# Patient Record
Sex: MALE | Race: WHITE
[De-identification: names, ages, dates, MRNs, and addresses within clinical notes are randomized per-mention and may not be internally consistent; named-entity substitution may affect disease eponyms.]

---

## 2017-12-13 ENCOUNTER — HOSPITAL ENCOUNTER (EMERGENCY)
Dept: HOSPITAL 62 - ER | Age: 67
Discharge: HOME | End: 2017-12-13
Payer: MEDICARE

## 2017-12-13 VITALS — DIASTOLIC BLOOD PRESSURE: 77 MMHG | SYSTOLIC BLOOD PRESSURE: 129 MMHG

## 2017-12-13 DIAGNOSIS — I10: ICD-10-CM

## 2017-12-13 DIAGNOSIS — Z79.82: ICD-10-CM

## 2017-12-13 DIAGNOSIS — D75.1: ICD-10-CM

## 2017-12-13 DIAGNOSIS — I25.10: ICD-10-CM

## 2017-12-13 DIAGNOSIS — E11.9: ICD-10-CM

## 2017-12-13 DIAGNOSIS — R53.1: Primary | ICD-10-CM

## 2017-12-13 LAB
ALBUMIN SERPL-MCNC: 4.2 G/DL (ref 3.5–5)
ALP SERPL-CCNC: 85 U/L (ref 38–126)
ALT SERPL-CCNC: 50 U/L (ref 21–72)
ANION GAP SERPL CALC-SCNC: 16 MMOL/L (ref 5–19)
APPEARANCE UR: CLEAR
AST SERPL-CCNC: 33 U/L (ref 17–59)
BASOPHILS # BLD AUTO: 0.1 10^3/UL (ref 0–0.2)
BASOPHILS NFR BLD AUTO: 0.9 % (ref 0–2)
BILIRUB DIRECT SERPL-MCNC: 0.3 MG/DL (ref 0–0.4)
BILIRUB SERPL-MCNC: 0.6 MG/DL (ref 0.2–1.3)
BILIRUB UR QL STRIP: NEGATIVE
BUN SERPL-MCNC: 16 MG/DL (ref 7–20)
CALCIUM: 9.8 MG/DL (ref 8.4–10.2)
CHLORIDE SERPL-SCNC: 99 MMOL/L (ref 98–107)
CO2 SERPL-SCNC: 22 MMOL/L (ref 22–30)
CREAT SERPL-MCNC: 1.02 MG/DL (ref 0.52–1.25)
EOSINOPHIL # BLD AUTO: 0.2 10^3/UL (ref 0–0.6)
EOSINOPHIL NFR BLD AUTO: 2.4 % (ref 0–6)
ERYTHROCYTE [DISTWIDTH] IN BLOOD BY AUTOMATED COUNT: 13.3 % (ref 11.5–14)
GLUCOSE SERPL-MCNC: 138 MG/DL (ref 75–110)
GLUCOSE UR STRIP-MCNC: >=500 MG/DL
HCT VFR BLD CALC: 52.8 % (ref 37.9–51)
HGB BLD-MCNC: 18.1 G/DL (ref 13.5–17)
HGB HCT DIFFERENCE: 1.5
KETONES UR STRIP-MCNC: (no result) MG/DL
LYMPHOCYTES # BLD AUTO: 1.3 10^3/UL (ref 0.5–4.7)
LYMPHOCYTES NFR BLD AUTO: 15.9 % (ref 13–45)
MCH RBC QN AUTO: 30.9 PG (ref 27–33.4)
MCHC RBC AUTO-ENTMCNC: 34.4 G/DL (ref 32–36)
MCV RBC AUTO: 90 FL (ref 80–97)
MONOCYTES # BLD AUTO: 0.8 10^3/UL (ref 0.1–1.4)
MONOCYTES NFR BLD AUTO: 9.5 % (ref 3–13)
NEUTROPHILS # BLD AUTO: 5.9 10^3/UL (ref 1.7–8.2)
NEUTS SEG NFR BLD AUTO: 71.3 % (ref 42–78)
NITRITE UR QL STRIP: NEGATIVE
PH UR STRIP: 5 [PH] (ref 5–9)
POTASSIUM SERPL-SCNC: 4.7 MMOL/L (ref 3.6–5)
PROT SERPL-MCNC: 7.2 G/DL (ref 6.3–8.2)
PROT UR STRIP-MCNC: 100 MG/DL
RBC # BLD AUTO: 5.87 10^6/UL (ref 4.35–5.55)
SODIUM SERPL-SCNC: 136.8 MMOL/L (ref 137–145)
SP GR UR STRIP: 1.03
UROBILINOGEN UR-MCNC: NEGATIVE MG/DL (ref ?–2)
WBC # BLD AUTO: 8.3 10^3/UL (ref 4–10.5)

## 2017-12-13 PROCEDURE — 80053 COMPREHEN METABOLIC PANEL: CPT

## 2017-12-13 PROCEDURE — 84484 ASSAY OF TROPONIN QUANT: CPT

## 2017-12-13 PROCEDURE — 36415 COLL VENOUS BLD VENIPUNCTURE: CPT

## 2017-12-13 PROCEDURE — 81001 URINALYSIS AUTO W/SCOPE: CPT

## 2017-12-13 PROCEDURE — 93010 ELECTROCARDIOGRAM REPORT: CPT

## 2017-12-13 PROCEDURE — 99285 EMERGENCY DEPT VISIT HI MDM: CPT

## 2017-12-13 PROCEDURE — 93005 ELECTROCARDIOGRAM TRACING: CPT

## 2017-12-13 PROCEDURE — 85025 COMPLETE CBC W/AUTO DIFF WBC: CPT

## 2017-12-13 PROCEDURE — 70450 CT HEAD/BRAIN W/O DYE: CPT

## 2017-12-13 NOTE — EKG REPORT
SEVERITY:- OTHERWISE NORMAL ECG -

SINUS TACHYCARDIA

BORDERLINE LEFT AXIS DEVIATION

:

Confirmed by: Harry Martines 13-Dec-2017 22:22:36

## 2017-12-13 NOTE — ER DOCUMENT REPORT
ED Neuro Symptoms/Deficit





- General


Chief Complaint: General Weakness


Stated Complaint: WEAKNESS IN RIGHT HAND


Time Seen by Provider: 12/13/17 17:29


Notes: 





Patient had use of his right hand, according to his wife.  He had an episode a 

month or so ago, around early or mid October, with both his right hand and leg 

being difficult to control.  He was seen here and had a complete workup 

including CT scan of the brain and was felt to be improving and discharged 

home.  Patient right hand recovered completely as did his right leg.  Then, 

yesterday, the weakness of the right hand returned.  Patient was also feeling 

lightheaded and dizzy.  He did not lose his balance or fall, however.  He does 

not have involvement of his leg at this time.  Wife says that he seems to be 

improving somewhat today.





Patient has a history of hydrocephalus for which he had shunt placed in his 

brain in 1979.





PMH: Coronary stent, hypertension, NIDDM.  Patient's primary care provider is 

Dr. Burch.


TRAVEL OUTSIDE OF THE [place] IN LAST 30 DAYS: No





- Related Data


Allergies/Adverse Reactions: 


 





No Known Allergies Allergy (Verified 12/13/17 16:21)


 











Past Medical History





- Social History


Smoking Status: Never Smoker


Chew tobacco use (# tins/day): No


Frequency of alcohol use: None


Drug Abuse: None


Family History: Reviewed & Not Pertinent


Patient has suicidal ideation: No


Patient has homicidal ideation: No





- Past Medical History


Cardiac Medical History: Reports: Hx Coronary Artery Disease, Hx Hypertension


Endocrine Medical History: Reports: Hx Diabetes Mellitus Type 1, Hx Diabetes 

Mellitus Type 2


Past Surgical History: Reports: Hx Cardiac Catheterization, Hx Neurologic 

Surgery - brain for hydrocephalus.





Review of Systems





- Review of Systems


Notes: 





REVIEW OF SYSTEMS:





CONSTITUTIONAL :  Denies fever.


  


EENT:   Denies eye, ear, nose or mouth or throat pain or other symptoms.





CARDIOVASCULAR:  Denies chest pain.





RESPIRATORY:  Denies cough, chest congestion, or shortness of breath.





GASTROINTESTINAL:  Denies abdominal pain or nausea, vomiting, or diarrhea.





GENITOURINARY:  Denies difficulty or painful urinating, urinary frequency, 

blood in urine.





MUSCULOSKELETAL:  Denies back or neck pain.  Denies joint pain or swelling.





SKIN:   Denies rash or skin lesions.





NEUROLOGICAL:  Denies LOC or altered mental status.  Denies headache.  See HPI.

  





ALL OTHER SYSTEMS REVIEWED AND NEGATIVE.





Physical Exam





- Vital signs


Vitals: 


 











Temp Pulse Resp BP Pulse Ox


 


 98.3 F   126 H  18   139/88 H  95 


 


 12/13/17 16:24  12/13/17 16:24  12/13/17 16:24  12/13/17 16:24  12/13/17 16:24











Interpretation: Tachycardic - Tachycardia in triage not found by me on 

examining patient, heart rate at bedside 100.





- Notes


Notes: 


PHYSICAL EXAMINATION:





GENERAL: Well-appearing, in no acute distress.  Patient can stand without 

assistance and walk around the examining room without difficulty and without 

losing his balance.





HEAD: Atraumatic, normocephalic.





EYES: Pupils equal round and reactive to light, extraocular movements intact.





ENT: oropharynx clear without exudates.  Moist mucous membranes.





NECK: Normal range of motion, supple.





LUNGS: Breath sounds clear and equal bilaterally.





HEART: Regular rate and rhythm without murmurs.





ABDOMEN: Soft, nontender.  No guarding or rebound.





BACK:  No tenderness throughout entire back.





EXTREMITIES: Normal range of motion without pain.





NEUROLOGICAL:  Normal speech, normal gait.  Normal sensory, motor, and reflex 

exams.  Patient has equal  with both hands.  No current deficits.  Awake, 

alert, and oriented x3.  Cranial nerves normal.





PSYCH: Normal mood, normal affect.





SKIN: Warm, dry, no rashes.





Course





- Re-evaluation


Re-evalutation: 





12/13/17 19:53


12/13/17 19:51


Patient remained stable throughout his stay in the department.





He takes a baby aspirin every morning around 10 AM and I advised him to take 1 

tonight at bedtime and to take baby aspirin twice a day until he sees his 

primary care provider and they discuss his blood thinning, given the elevated 

hemoglobin that he has.





- Vital Signs


Vital signs: 


 











Temp Pulse Resp BP Pulse Ox


 


 98.2 F   126 H  13   129/77 H  94 


 


 12/13/17 18:01  12/13/17 16:24  12/13/17 19:14  12/13/17 19:14  12/13/17 19:14














12/13/17 19:53








- Laboratory


Result Diagrams: 


 12/13/17 17:51





 12/13/17 17:51


Laboratory results interpreted by me: 


 











  12/13/17 12/13/17 12/13/17





  17:51 17:51 18:50


 


RBC  5.87 H  


 


Hgb  18.1 H  


 


Hct  52.8 H  


 


Sodium   136.8 L 


 


Glucose   138 H 


 


Urine Protein    100 H


 


Urine Glucose (UA)    >=500 H


 


Urine Ketones    TRACE H


 


Urine Ascorbic Acid    40 H











12/13/17 19:52


Incidentally noted GAB globin of 18.1.  Patient and wife not aware of this in 

the patient's history.  I have copied all the labs for them to take with them 

and contact her primary care provider tomorrow.





- Diagnostic Test


Radiology reviewed: Image reviewed, Reports reviewed - CT scan of the brain 

shows a stable mass in the ventricle that is about the same size on previous 

exam.  No acute findings on the CT scan.  No evidence of stroke.





- EKG Interpretation by Me


EKG shows normal: Sinus rhythm


Rate: Normal


Rhythm: NSR - At 102.





Discharge





- Discharge


Clinical Impression: 


 Weakness of right hand, Polycythemia





Condition: Stable


Disposition: HOME, SELF-CARE


Additional Instructions: 


Weakness Right Hand:


     We did not find a definite cause for your weakness. This may require 

further medical tests. Weakness can be caused by infection, physical exhaustion

, rapid weight loss, dehydration, or medicine side effects. Diseases of the 

muscles, heart, nerves, and blood vessels can make you weak. Sometimes the 

problem is simply depression or lack of exercise.


     You should get plenty of rest. Unless the doctor tells you otherwise, it's 

usually best to add short periods of regular mild exercise. Eat a nutritious 

diet with multiple small, low-sugar meals.


     If symptoms continue, additional medical evaluation will be necessary. Be 

sure to follow up as instructed.


     If you become very dizzy, nauseated, or feel like you're going to faint, 

lie down right away. Wait until the symptoms have passed before you get up 

again. Stand up slowly.


     Call the doctor or return if you develop chest pain, abdominal pain, 

severe headache, irregular heartbeat or very fast pulse, confusion, vision 

problems, fever, muscular pain, or any other new symptom.





Your CT scan findings are of a chronic, previously seen mass that has not 

enlarged.  There is no evidence of any increased intracranial pressure.





Your hemoglobin was 18.1.


Polycythemia


     We have found a higher than normal count of red blood cells. When the 

blood is thick with extra red cells, we call it "polycythemia." Blood cells are 

created in your bone marrow. In polycythemia, the marrow is over-active, making 

extra blood cells. Polycythemia can be a reaction to low oxygen in your blood, 

as occurs with chronic bronchitis or sleep apnea. Sometimes no clear cause is 

found.


     Polycythemia can be dangerous, because the thickened blood clots more 

easily. There's a higher risk of stroke, heart attack, and blood clots.


     The best treatment for polycythemia is to treat the underlying cause. For 

example, treating lung disease to increase the blood oxygen may lower the count 

of red blood cells. If it's not possible to eliminate the cause of polycythemia

, you may be treated by removing some of your blood from time to time. This 

lowers the count of red cells and makes the blood thinner.


     Call your doctor or return if you have chest pain or new shortness of 

breath, or symptoms of a stroke such as memory problems, severe headache, 

vomiting, severe dizziness, weakness or numbness, double vision, a seizure, or 

problems with balance or coordination.





Increase your baby aspirin to twice a day instead of the current once a day.





FOLLOW-UP CARE:


If you have been referred to a physician for follow-up care, call the physician

s office for an appointment as you were instructed or within the next two days.

  If you experience worsening or a significant change in your symptoms, notify 

the physician immediately or return to the Emergency Department at any time for 

re-evaluation.














Contact Dr. Burch's office tomorrow morning to inform them of your visit 

here in the emergency department and the weakness that you are having in your 

right hand.  Also tell them about the finding of your increased hemoglobin 

level of 18.1.  Finally, find out if they want to to be on any additional blood 

thinning medications.

## 2017-12-13 NOTE — RADIOLOGY REPORT (SQ)
EXAM DESCRIPTION:  CT HEAD WITHOUT



COMPLETED DATE/TIME:  12/13/2017 5:30 pm



REASON FOR STUDY:  right hand weak



COMPARISON:  11/8/2017.



TECHNIQUE:  Axial images acquired through the brain without intravenous contrast.  Images reviewed wi
th bone, brain and subdural windows.  Images stored on PACS.

All CT scanners at this facility use dose modulation, iterative reconstruction, and/or weight based d
osing when appropriate to reduce radiation dose to as low as reasonably achievable (ALARA).

[facility]: Dose Right  [facility]: CareDose    [facility]: Dose Right    [facility]: Teradose 4D    [facility]: Smart Technologies



RADIATION DOSE:   mGy.



LIMITATIONS:  None.



FINDINGS:  VENTRICLES: Stable mild ventriculomegaly.  Bilateral ventricular shunt tubes.  Stable 2.3 
cm mass in the midline, possibly the 3rd ventricle.

CEREBRUM: No masses.  No hemorrhage.  No midline shift.  Areas of low density in the white matter mos
t likely due to chronic micro-vascular ischemic change.  No evidence for acute infarction.

CEREBELLUM: No masses.  No hemorrhage.  No alteration of density.  No evidence for acute infarction.

EXTRAAXIAL SPACES: Mild age-related involutional change.  No fluid collections.  No masses.

ORBITS AND GLOBE: No intra- or extraconal masses.  Normal contour of globe without masses.

CALVARIUM: No fracture.

PARANASAL SINUSES: No fluid or mucosal thickening.

SOFT TISSUES: No mass or hematoma.

OTHER: No other significant finding.



IMPRESSION:

1. 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

2. STABLE MILD VENTRICULOMEGALY WITH BILATERAL VENTRICULAR SHUNT TUBES.

3. MILD CHRONIC CHANGES OF ATROPHY AND MICROVASCULAR ISCHEMIA.  NO ACUTE PROCESS.

EVIDENCE OF ACUTE STROKE: NO.



TECHNICAL DOCUMENTATION:  JOB ID:  [id number]

Quality ID # [id number]: Final reports with documentation of one or more dose reduction techniques (e.g., Au
tomated exposure control, adjustment of the mA and/or kV according to patient size, use of iterative 
reconstruction technique)

 2011 [employer]- All Rights Reserved

## 2018-06-17 ENCOUNTER — HOSPITAL ENCOUNTER (INPATIENT)
Dept: HOSPITAL 62 - ER | Age: 68
LOS: 5 days | Discharge: NURSING, REHAB OR LONG-TERM CARE | DRG: 65 | End: 2018-06-22
Attending: INTERNAL MEDICINE | Admitting: INTERNAL MEDICINE
Payer: MEDICARE

## 2018-06-17 DIAGNOSIS — Z79.4: ICD-10-CM

## 2018-06-17 DIAGNOSIS — I10: ICD-10-CM

## 2018-06-17 DIAGNOSIS — F41.1: ICD-10-CM

## 2018-06-17 DIAGNOSIS — Z80.0: ICD-10-CM

## 2018-06-17 DIAGNOSIS — Z95.5: ICD-10-CM

## 2018-06-17 DIAGNOSIS — F41.8: ICD-10-CM

## 2018-06-17 DIAGNOSIS — Z79.899: ICD-10-CM

## 2018-06-17 DIAGNOSIS — Z79.82: ICD-10-CM

## 2018-06-17 DIAGNOSIS — R47.01: ICD-10-CM

## 2018-06-17 DIAGNOSIS — R47.81: ICD-10-CM

## 2018-06-17 DIAGNOSIS — E11.51: ICD-10-CM

## 2018-06-17 DIAGNOSIS — G89.4: ICD-10-CM

## 2018-06-17 DIAGNOSIS — I63.212: Primary | ICD-10-CM

## 2018-06-17 DIAGNOSIS — M79.641: ICD-10-CM

## 2018-06-17 DIAGNOSIS — Z66: ICD-10-CM

## 2018-06-17 DIAGNOSIS — E78.00: ICD-10-CM

## 2018-06-17 DIAGNOSIS — Z82.49: ICD-10-CM

## 2018-06-17 DIAGNOSIS — E66.01: ICD-10-CM

## 2018-06-17 DIAGNOSIS — Z87.891: ICD-10-CM

## 2018-06-17 DIAGNOSIS — F32.9: ICD-10-CM

## 2018-06-17 DIAGNOSIS — G81.91: ICD-10-CM

## 2018-06-17 DIAGNOSIS — E11.65: ICD-10-CM

## 2018-06-17 DIAGNOSIS — I25.10: ICD-10-CM

## 2018-06-17 LAB
ADD MANUAL DIFF: NO
ALBUMIN SERPL-MCNC: 4.4 G/DL (ref 3.5–5)
ALP SERPL-CCNC: 80 U/L (ref 38–126)
ALT SERPL-CCNC: 56 U/L (ref 21–72)
ANION GAP SERPL CALC-SCNC: 16 MMOL/L (ref 5–19)
APPEARANCE UR: CLEAR
APPEARANCE UR: CLEAR
APTT BLD: 26.8 SEC (ref 23.5–35.8)
APTT PPP: YELLOW S
APTT PPP: YELLOW S
ARTERIAL BLOOD FIO2: (no result)
ARTERIAL BLOOD H2CO3: 0.9 MMOL/L (ref 1.05–1.35)
ARTERIAL BLOOD HCO3: 21.1 MMOL/L (ref 20–26)
ARTERIAL BLOOD PCO2: 30 MMHG (ref 35–45)
ARTERIAL BLOOD PH: 7.46 (ref 7.35–7.45)
ARTERIAL BLOOD PO2: 80.4 MMHG (ref 80–100)
ARTERIAL BLOOD TOTAL CO2: 22 MMOL/L (ref 23–27)
AST SERPL-CCNC: 38 U/L (ref 17–59)
BARBITURATES UR QL SCN: NEGATIVE
BASE EXCESS BLDA CALC-SCNC: -1.2 MMOL/L
BASOPHILS # BLD AUTO: 0 10^3/UL (ref 0–0.2)
BASOPHILS NFR BLD AUTO: 0.5 % (ref 0–2)
BILIRUB DIRECT SERPL-MCNC: 0.5 MG/DL (ref 0–0.4)
BILIRUB SERPL-MCNC: 0.7 MG/DL (ref 0.2–1.3)
BILIRUB UR QL STRIP: NEGATIVE
BILIRUB UR QL STRIP: NEGATIVE
BUN SERPL-MCNC: 14 MG/DL (ref 7–20)
CALCIUM: 9.7 MG/DL (ref 8.4–10.2)
CHLORIDE SERPL-SCNC: 97 MMOL/L (ref 98–107)
CK MB SERPL-MCNC: 2.8 NG/ML (ref ?–4.55)
CK MB SERPL-MCNC: 3.25 NG/ML (ref ?–4.55)
CK SERPL-CCNC: 95 U/L (ref 55–170)
CO2 SERPL-SCNC: 25 MMOL/L (ref 22–30)
EOSINOPHIL # BLD AUTO: 0.1 10^3/UL (ref 0–0.6)
EOSINOPHIL NFR BLD AUTO: 1 % (ref 0–6)
ERYTHROCYTE [DISTWIDTH] IN BLOOD BY AUTOMATED COUNT: 13.5 % (ref 11.5–14)
GLUCOSE SERPL-MCNC: 229 MG/DL (ref 75–110)
GLUCOSE UR STRIP-MCNC: >=500 MG/DL
GLUCOSE UR STRIP-MCNC: >=500 MG/DL
HCT VFR BLD CALC: 52 % (ref 37.9–51)
HGB BLD-MCNC: 17.9 G/DL (ref 13.5–17)
INR PPP: 0.94
KETONES UR STRIP-MCNC: (no result) MG/DL
KETONES UR STRIP-MCNC: 20 MG/DL
LYMPHOCYTES # BLD AUTO: 1.5 10^3/UL (ref 0.5–4.7)
LYMPHOCYTES NFR BLD AUTO: 14.8 % (ref 13–45)
MCH RBC QN AUTO: 30.7 PG (ref 27–33.4)
MCHC RBC AUTO-ENTMCNC: 34.5 G/DL (ref 32–36)
MCV RBC AUTO: 89 FL (ref 80–97)
METHADONE UR QL SCN: NEGATIVE
MONOCYTES # BLD AUTO: 0.6 10^3/UL (ref 0.1–1.4)
MONOCYTES NFR BLD AUTO: 6.1 % (ref 3–13)
NEUTROPHILS # BLD AUTO: 8 10^3/UL (ref 1.7–8.2)
NEUTS SEG NFR BLD AUTO: 77.6 % (ref 42–78)
NITRITE UR QL STRIP: NEGATIVE
NITRITE UR QL STRIP: NEGATIVE
PCP UR QL SCN: NEGATIVE
PH UR STRIP: 5 [PH] (ref 5–9)
PH UR STRIP: 6 [PH] (ref 5–9)
PLATELET # BLD: 229 10^3/UL (ref 150–450)
POTASSIUM SERPL-SCNC: 4.7 MMOL/L (ref 3.6–5)
PROT SERPL-MCNC: 7.5 G/DL (ref 6.3–8.2)
PROT UR STRIP-MCNC: >=500 MG/DL
PROT UR STRIP-MCNC: >=500 MG/DL
PROTHROMBIN TIME: 13.1 SEC (ref 11.4–15.4)
RBC # BLD AUTO: 5.83 10^6/UL (ref 4.35–5.55)
SAO2 % BLDA: 96.6 % (ref 94–98)
SODIUM SERPL-SCNC: 138.1 MMOL/L (ref 137–145)
SP GR UR STRIP: 1.02
SP GR UR STRIP: 1.04
TOTAL CELLS COUNTED % (AUTO): 100 %
TROPONIN I SERPL-MCNC: 0.35 NG/ML
TROPONIN I SERPL-MCNC: < 0.012 NG/ML
URINE AMPHETAMINES SCREEN: NEGATIVE
URINE BENZODIAZEPINES SCREEN: NEGATIVE
URINE COCAINE SCREEN: NEGATIVE
URINE MARIJUANA (THC) SCREEN: NEGATIVE
UROBILINOGEN UR-MCNC: NEGATIVE MG/DL (ref ?–2)
UROBILINOGEN UR-MCNC: NEGATIVE MG/DL (ref ?–2)
WBC # BLD AUTO: 10.3 10^3/UL (ref 4–10.5)

## 2018-06-17 PROCEDURE — 70450 CT HEAD/BRAIN W/O DYE: CPT

## 2018-06-17 PROCEDURE — S0164 INJECTION PANTROPRAZOLE: HCPCS

## 2018-06-17 PROCEDURE — 81001 URINALYSIS AUTO W/SCOPE: CPT

## 2018-06-17 PROCEDURE — 85730 THROMBOPLASTIN TIME PARTIAL: CPT

## 2018-06-17 PROCEDURE — 87040 BLOOD CULTURE FOR BACTERIA: CPT

## 2018-06-17 PROCEDURE — 83036 HEMOGLOBIN GLYCOSYLATED A1C: CPT

## 2018-06-17 PROCEDURE — 80048 BASIC METABOLIC PNL TOTAL CA: CPT

## 2018-06-17 PROCEDURE — 82962 GLUCOSE BLOOD TEST: CPT

## 2018-06-17 PROCEDURE — 99291 CRITICAL CARE FIRST HOUR: CPT

## 2018-06-17 PROCEDURE — 84484 ASSAY OF TROPONIN QUANT: CPT

## 2018-06-17 PROCEDURE — 36415 COLL VENOUS BLD VENIPUNCTURE: CPT

## 2018-06-17 PROCEDURE — 70496 CT ANGIOGRAPHY HEAD: CPT

## 2018-06-17 PROCEDURE — 82803 BLOOD GASES ANY COMBINATION: CPT

## 2018-06-17 PROCEDURE — 96374 THER/PROPH/DIAG INJ IV PUSH: CPT

## 2018-06-17 PROCEDURE — 80307 DRUG TEST PRSMV CHEM ANLYZR: CPT

## 2018-06-17 PROCEDURE — 85027 COMPLETE CBC AUTOMATED: CPT

## 2018-06-17 PROCEDURE — 71045 X-RAY EXAM CHEST 1 VIEW: CPT

## 2018-06-17 PROCEDURE — 70498 CT ANGIOGRAPHY NECK: CPT

## 2018-06-17 PROCEDURE — 84550 ASSAY OF BLOOD/URIC ACID: CPT

## 2018-06-17 PROCEDURE — 36600 WITHDRAWAL OF ARTERIAL BLOOD: CPT

## 2018-06-17 PROCEDURE — 93005 ELECTROCARDIOGRAM TRACING: CPT

## 2018-06-17 PROCEDURE — 85025 COMPLETE CBC W/AUTO DIFF WBC: CPT

## 2018-06-17 PROCEDURE — 85610 PROTHROMBIN TIME: CPT

## 2018-06-17 PROCEDURE — 93010 ELECTROCARDIOGRAM REPORT: CPT

## 2018-06-17 PROCEDURE — 82553 CREATINE MB FRACTION: CPT

## 2018-06-17 PROCEDURE — 80053 COMPREHEN METABOLIC PANEL: CPT

## 2018-06-17 PROCEDURE — 82550 ASSAY OF CK (CPK): CPT

## 2018-06-17 PROCEDURE — 80061 LIPID PANEL: CPT

## 2018-06-17 PROCEDURE — 93306 TTE W/DOPPLER COMPLETE: CPT

## 2018-06-17 RX ADMIN — Medication SCH ML: at 21:42

## 2018-06-17 RX ADMIN — INSULIN LISPRO PRN UNIT: 100 INJECTION, SOLUTION INTRAVENOUS; SUBCUTANEOUS at 23:00

## 2018-06-17 RX ADMIN — SODIUM CHLORIDE PRN ML: 9 INJECTION, SOLUTION INTRAVENOUS at 20:18

## 2018-06-17 RX ADMIN — ATORVASTATIN CALCIUM SCH MG: 20 TABLET, FILM COATED ORAL at 21:42

## 2018-06-17 NOTE — PDOC H&P
History of Present Illness


Admission Date/PCP: 


  18 15:20





  JOVI BERG MD





Patient complains of: Weakness and falls slurred speech


History of Present Illness: 


JOVI VITALE is a 67 year old man with difficult to control diabetes, 

obesity, hypertension who started falling last night, his wife wanted to take 

him to the ER but he did not want to go, this morning he was having slurred 

speech and she called 911.  The patient also had right arm weakness.  In the ER 

he had signs and symptoms consistent with stroke, onset time unknown and he was 

not a TPA candidate.  Scan of the head showed acute or subacute nonhemorrhagic 

stroke of the left posterior temporal cortex, along with a possible colloid 

cyst which has been seen in the past and also intraventricular drainage 

catheters unchanged from prior studies.  Patient has been able to protect his 

airway.  He has some expressive aphasia.  He is being admitted to the 

hospitalist service, [facility], for continued management.








Past Medical History


Cardiac Medical History: Reports: Coronary Artery Disease, Hyperlipidema, 

Hypertension, Peripheral Vascular Disease


Neurological Medical History: Reports: Other - Ventricular shunts for 

hydrocephalus.


   Denies: Hemorrhagic CVA, Ischemic CVA, Seizures


Endocrine Medical History: Reports: Diabetes Mellitus Type 1, Diabetes Mellitus 

Type 2


Renal/ Medical History: 


   Denies: End Stage Renal Disease


Malignancy Medical History: 


   Denies: None


GI Medical History: 


   Denies: Cirrhosis


Musculoskeltal Medical History: 


   Denies: Fibromyalgia, Gout


Skin Medical History: 


   Denies: Eczema, Psoriasis


Psychiatric Medical History: Reports: Depression, General Anxiety Disorder


   Denies: Alcohol Dependency, Dementia, Substance Abuse


Traumatic Medical History: 


   Denies: None


Hematology: 


   Denies: Anemia


Infectious Medical History: 


   Denies: None





Past Surgical History


Past Surgical History: Reports: Cardiac Catheterization, Coronary Stent, 

Orthopedic Surgery - bilateral rotator cuff repairs





Social History


Information Source: Relative


Occupation: 





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


Lives with: Spouse/Significant other


Smoking Status: Former Smoker


Last Time Smoked: 30 years


Frequency of Alcohol Use: None


Hx Recreational Drug Use: No


Hx Prescription Drug Abuse: No


Past Social History Note: 





Patient is here with his wife.  She tells me that he has 1 daughter from a 

previous marriage who lives in [place] and they are not really in touch with 

her.  She does not know that the patient is here.  He tells me that he is very 

sedentary at home [facility].





- Advance Directive


Resuscitation Status: Do Not Resuscitate


Surrogate healthcare decision maker:: 





The patient's wife is named Veronique her phone number is [phone]





Family History


Parental Family History Reviewed: Yes - Mother  with colon cancer


Children Family History Reviewed: Yes - 1 adult daughter who is healthy as far 

as his wife knows


Sibling(s) Family History Reviewed.: Unknown - Patient has 1 sister who had 

coronary artery disease





Medication/Allergy


Home Medications: 








Armodafinil [Nuvigil] 150 mg PO DAILY 18 


Aspirin [Aspirin 325 mg Tablet] 325 mg PO DAILY 18 


Buspirone HCl [Buspar 10 mg Tablet] 10 mg PO DAILY@1000,1400,1800 18 


Dapagliflozin Propanediol [Farxiga] 5 mg PO DAILY 18 


Escitalopram Oxalate [Lexapro] 20 mg PO DAILY 18 


Fesoterodine Fumarate [Toviaz] 4 mg PO DAILY 18 


Gabapentin [Neurontin 100 mg Capsule] 200 mg PO DAILY@1000,1400,1800 18 


Hydralazine HCl [Apresoline 25 mg Tablet] 25 mg PO DAILY@1000,1400,1800  


Hydrocodone Bit/Acetaminophen [Hydrocodon-Acetaminoph 2.5-325] 1 tab PO Q12HP 

PRN 18 


Insulin Aspart [Novolog Flexpen] 20 unit SQ .TIDAC 18 


Insulin Detemir [Levemir Flextouch] 55 unit SQ QHS 18 


Lorazepam [Ativan 0.5 mg Tablet] 0.25 mg PO Q8HP PRN 18 


Methocarbamol [Robaxin 750 mg Tablet] 750 mg PO Q8HP PRN 18 


Mirabegron [Myrbetriq] 50 mg PO DAILY 18 


Montelukast Sodium [Singulair 10 mg Tablet] 10 mg PO DAILY 18 


Omega-3 Acid Ethyl Esters [Lovaza 1 gm Capsule] 2 gm PO DAILY@1400 18 


Omega-3 Acid Ethyl Esters [Lovaza 1 gm Capsule] 2 gm PO QHS 18 


Omeprazole 40 mg PO QPM 18 


Tamsulosin HCl [Flomax 0.4 mg Cap.sr] 0.4 mg PO DAILY 18 


Valsartan [Diovan] 320 mg PO DAILY@1400 18 


Zolpidem Tartrate [Ambien] 10 mg PO HSP PRN 18 








Allergies/Adverse Reactions: 


 





No Known Allergies Allergy (Verified 17 16:21)


 











Review of Systems


ROS unobtainable: Due to mental status, Other - Obtained from wife as patient 

is unable to appropriately express himself.


Constitutional: ABSENT: chills, fever(s)


Eyes: ABSENT: visual disturbances


Ears: ABSENT: hearing changes


Cardiovascular: ABSENT: chest pain, dyspnea on exertion


Respiratory: ABSENT: cough


Gastrointestinal: ABSENT: nausea, vomiting


Neurological: PRESENT: abnormal speech, focal weakness


Psychiatric: PRESENT: anxiety, depression





Physical Exam


Vital Signs: 


 











Temp Pulse Resp BP Pulse Ox


 


 99.3 F   112 H  22 H  170/97 H  96 


 


 18 17:59  18 18:17  18 18:17  18 18:17  18 18:17








 Intake & Output











 18





 06:59 06:59 06:59


 


Output Total   700


 


Balance   -700











General appearance: PRESENT: mild distress, morbidly obese


Head exam: PRESENT: atraumatic, normocephalic


Eye exam: PRESENT: EOMI.  ABSENT: conjunctival injection, scleral icterus


Ear exam: PRESENT: normal external ear exam


Mouth exam: PRESENT: dry mucosa, tongue midline - Patient could not protrude 

his tongue fully but it appears to be midline


Neck exam: ABSENT: lymphadenopathy, tracheal deviation


Respiratory exam: PRESENT: clear to auscultation orlin, unlabored.  ABSENT: rales

, rhonchi, wheezes


Cardiovascular exam: PRESENT: tachycardia.  ABSENT: systolic murmur


Pulses: PRESENT: normal radial pulses


GI/Abdominal exam: PRESENT: normal bowel sounds, soft.  ABSENT: distended, 

guarding, tenderness


Gentrourinary exam: ABSENT: scrotal swelling


Extremities exam: ABSENT: pedal edema


Musculoskeletal exam: PRESENT: normal inspection


Neurological exam: PRESENT: awake, other - Patient has expressive aphasia.  He 

is intermittently able to follow commands.  He is answering questions with a 

single yes or no but not always able to answer question.  He is able to move 

his right leg but not against gravity.  He cannot move his right arm.  He can 

move his left upper and lower extremities without difficulty.


Focused psych exam: PRESENT: other - Not assessed secondary to stroke symptoms


Skin exam: PRESENT: dry, intact, warm





Results


Laboratory Results: 


 











  18





  15:45


 


Urine Color  YELLOW


 


Urine Appearance  CLEAR


 


Urine pH  5.0


 


Ur Specific Gravity  1.022


 


Urine Protein  >=500 H


 


Urine Glucose (UA)  >=500 H


 


Urine Ketones  TRACE H


 


Urine Blood  NEGATIVE


 


Urine Nitrite  NEGATIVE


 


Ur Leukocyte Esterase  NEGATIVE


 


Urine WBC (Auto)  1


 


Urine RBC (Auto)  3











Impressions: 


 





Head CTA  18 00:00


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  Normal 

opacification of the [place].


 








Neck CTA  18 00:00


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  Up to 50% 

stenosis of the left internal carotid artery and left common carotid artery.  

No significant stenosis of the right carotid or vertebral arteries.


 








Chest X-Ray  18 13:25


IMPRESSION:  NO ACUTE RADIOGRAPHIC FINDING IN THE CHEST.


 








Head CT  18 13:25


IMPRESSION:  Acute or early subacute nonhemorrhagic infarct left posterior 

temporal cortex and subcortical white matter.


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  No 

hydrocephalus.  Intraventricular drainage catheters are unchanged from prior 

studies.


EVIDENCE OF ACUTE STROKE: Yes


 














Assessment & Plan





- Diagnosis


(1) Ischemic stroke


Is this a current diagnosis for this admission?: Yes   


Plan: 


Left posterior temporal cortex stroke, acute versus subacute, nonhemorrhagic.  

Patient's wife states that because of his ventricular shunts he cannot have an 

MRI and so I have ordered a CTA of the head and neck to evaluate the [place] 

[place], carotid and vertebral.  Echocardiogram has been ordered for when 

patient is more stable.  Cardiac enzymes are ordered, first set negative.  EKG 

not concerning for acute coronary syndrome.  Patient is able to swallow water 

without choking or coughing but he does have some drooling from the right side.

  I have allowed supervised small amounts of ice chips for now.  PT and OT are 

ordered.  Will allow permissive hypertension.  He is in the 170s and 180s 

systolic blood pressure and vitals parameters for calling doctor have been 

given to nursing.  Takes a full dose aspirin daily and wife does not know why 

he is on a full dose that he does have coronary disease and peripheral vascular 

disease, perhaps that is why.  I started him on Plavix 75 mg daily and 

transition him to a baby aspirin to reduce bleeding risks.  I have ordered DVT 

prophylaxis to start tomorrow morning with heparin 5000 units subcu every 8 

hours. [facility] exams are ordered.  Lipid panel and hemoglobin A1c ordered.








(2) Poorly controlled type 2 diabetes mellitus


Is this a current diagnosis for this admission?: Yes   


Plan: 


Patient is on multiple medications for diabetes.  I have ordered lispro sliding 

scale for now and we can add glargine if needed.  Patient is essentially n.p.o. 

secondary to stroke symptoms.  Will need good education for improved diabetic 

control.  Is clearly a stroke risk factor for this patient.








(3) Hypertension


Is this a current diagnosis for this admission?: Yes   


Plan: 


After permissive hypertension.  We will work on good blood pressure control 

prior to discharge.








(4) Coronary artery disease


Is this a current diagnosis for this admission?: Yes   


Plan: 


Per his wife he has at least one cardiac stent.  I transitioned him to a daily 

baby aspirin from a full dose aspirin as we have added Plavix.  Will work on 

good blood pressure control.  EKG in the ER not concerning for acute coronary 

syndrome.  Cardiac enzymes will be trended, first set normal.








(5) Depression with anxiety


Is this a current diagnosis for this admission?: Yes   


Plan: 


Patient is on BuSpar, Lexapro, as needed Ativan.  I started his Lexapro which 

hopefully he can get down safely with a sip of water.








(6) Chronic pain


Is this a current diagnosis for this admission?: Yes   


Plan: 


She is on as needed Vicodin and I will hold this for now given stroke status.








(7) Morbid obesity


Is this a current diagnosis for this admission?: Yes   


Plan: 


If patient recovers sufficient function hopefully we can work with him on a 

good weight loss and exercise plan to help prevent further strokes in the 

future.








- Time


Time Spent: Greater than 70 Minutes


Medications reviewed and adjusted accordingly: Yes





- Inpatient Certification


Based on my medical assessment, after consideration of the patient's 

comorbidities, presenting symptoms, or acuity I expect that the services needed 

warrant INPATIENT care.: Yes


I certify that my determination is in accordance with my understanding of 

Medicare's requirements for reasonable and necessary INPATIENT services [42 CFR 

412.3e].: Yes


Medical Necessity: Need for Neurological Checks

## 2018-06-17 NOTE — ER DOCUMENT REPORT
ED Neuro Symptoms/Deficit





- General


Stated Complaint: WEAKNESS


Time Seen by Provider: 06/17/18 14:15


Notes: 





Patient brought in by EMS for possible stroke.  Reportedly last known normal 

time was at 10:30 AM.  However, when the patient's wife arrived, further 

history is that the patient got up at 3:30 AM and fell and tried to get up a 

couple more times and fell each of those times, as well.  He was able to 

eventually call back to bed.  Wife noted he had some slurred speech then as 

well and had some difficulty moving his right hand.  Then, again about 6:30 AM, 

patient fell another time.  Wife noted the floor was wet and thinks he may have 

urinated on the floor.  He continued to have the slurred speech and right-sided 

weakness.  Called EMS this morning to bring the patient here.





Patient probably had a stroke with similar symptoms about a year or so ago, but 

was seen by an internist at [facility], but never admitted to the 

hospital.  He did not suffer any long-term sequelae.  He does have a shunt from 

his brain for an unknown entity.  He had this shown for the first time at 17 

years of age and the second time it was revised was about 40 years ago.  

Patient is very difficult to understand anything he is saying at this time.  He 

does try to follow commands and answer but has severe expressive aphasia now.


TRAVEL OUTSIDE OF THE [place] IN LAST 30 DAYS: No





- Related Data


Allergies/Adverse Reactions: 


 





No Known Allergies Allergy (Verified 12/13/17 16:21)


 











Past Medical History





- Social History


Smoking Status: Unknown if Ever Smoked


Family History: Reviewed & Not Pertinent





- Past Medical History


Cardiac Medical History: Reports: Hx Coronary Artery Disease, Hx 

Hypercholesterolemia, Hx Hypertension


Neurological Medical History: Reports: Hx Cerebrovascular Accident - 

Approximately 1 year ago, Other - Ventricular shunts for hydrocephalus.


Endocrine Medical History: Reports: Hx Diabetes Mellitus Type 1, Hx Diabetes 

Mellitus Type 2


Past Surgical History: Reports: Hx Cardiac Catheterization, Hx Neurologic 

Surgery - shunts of brain for hydrocephalus.





Review of Systems





- Review of Systems


-: Yes ROS unobtainable due to patient's medical condition - Patient cannot 

speak and wife is a very poor historian





Physical Exam





- Vital signs


Interpretation: Hypertensive.  No: Febrile





- Notes


Notes: 





PHYSICAL EXAMINATION:





GENERAL: Well-appearing, awake, tries to follow commands, cannot be understood 

what the patient is saying.





HEAD: Atraumatic, normocephalic.  I do not appreciate any definite droop of 

either side of his face.





EYES: Pupils equal round and reactive to light, extraocular movements intact.





ENT: oropharynx clear without exudates.  Moist mucous membranes.





NECK: Normal range of motion, supple.





LUNGS: Breath sounds clear and equal bilaterally.





HEART: Regular rate and rhythm without murmurs.





ABDOMEN: Soft, nontender.  No guarding or rebound.  No masses.





BACK:  No tenderness throughout entire back.





EXTREMITIES: Normal range of motion without pain.





NEUROLOGICAL: Patient has an expressive aphasia and is unable to converse 

understandably.  Unable to stand or walk.  Unable to assess sensory or reflexes

, but patient has definite weakness in elevating his right arm and in the right 

hand . 





PSYCH: Unable to assess.


SKIN: Warm, dry, no rashes.





Course





- Re-evaluation


Re-evalutation: 





06/17/18 14:32


From patient's initial symptoms at 3:30 AM until arrival here in the emergency 

department around 1 PM, he is far beyond the window for getting TPA for his 

stroke.











- Laboratory


Result Diagrams: 


 06/17/18 13:45





 06/17/18 13:45


Laboratory results interpreted by me: 


 











  06/17/18





  13:45


 


RBC  5.83 H


 


Hgb  17.9 H


 


Hct  52.0 H














- Diagnostic Test


Radiology reviewed: Image reviewed, Reports reviewed - CT scan shows a subacute 

nonhemorrhagic infarct of the left posterior temporal cortex and subcortical 

white matter.  No hemorrhage in the cerebellum or cerebrum.  Previously known 

shunt present.


Radiology results interpreted by me: 





06/17/18 14:31


Chest x-ray shows some mild cardiomegaly but otherwise is unremarkable and 

normal.





- EKG Interpretation by Me


EKG shows normal: Sinus rhythm


Rate: Normal, Tachycardia - 105


Rhythm: NSR





Critical Care Note





- Critical Care Note


Total time excluding time spent on procedures (mins): 60





Discharge





- Discharge


Clinical Impression: 


 Stroke





Condition: Serious


Disposition: ADMITTED AS INPATIENT


Admitting Provider: Hospitalist


Unit Admitted: [facility]

## 2018-06-17 NOTE — RADIOLOGY REPORT (SQ)
EXAM DESCRIPTION:  CTA NECK



COMPLETED DATE/TIME:  6/17/2018 5:09 pm



REASON FOR STUDY:  cva



COMPARISON:  None.



TECHNIQUE:  Axial dynamic scanning technique with  dynamic contrast enhancement through the extra-cra
nial carotid and vertebral  arteries.  Multiplanar reconstruction.  3-D MIPS and Volume-rendered imag
es  acquired at the workstation and saved to PACS.  Images are reviewed in soft  tissue, bone, lung w
indows.

All CT scanners at this facility use dose modulation, iterative reconstruction, and/or weight based d
osing when appropriate to reduce radiation dose to as low as reasonably achievable (ALARA).

[facility]: Dose Right  [facility]: CareDose    [facility]: Dose Right    [facility]: Teradose 4D    [facility]: Smart Technologies



CONTRAST TYPE AND DOSE:  contrast/concentration: Isovue 370.00 mg/ml; Total Contrast Delivered: 70.0 
ml; Total Saline Delivered: 75.0 ml



RENAL FUNCTION:  BUN 14; creatinine 0.91



LIMITATIONS:  None.



FINDINGS:  AORTIC ARCH: Normal three-vessel origin.  Bilateral subclavian arteries are patent.  No  d
issection.

RIGHT CAROTIDS: Calcified and noncalcified plaque at the level of the carotid bulb and the origins of
 the internal and external carotid arteries. No significant stenosis.  No dissection.

RIGHT VERTEBRAL: Patent.  No dissection.  Less than 25% stenosis of the intracranial segment due to c
alcified and noncalcified plaque.

LEFT CAROTIDS: 25 to 50% stenosis of the common carotid artery at the level of the bulb due to predom
inantly calcified plaque.  Approximately 50% stenosis of the origin of the internal carotid artery on
 the basis of calcified and noncalcified plaque.  The external carotid artery remains patent.

LEFT VERTEBRAL: The intracranial/terminal left vertebral artery is occluded.

OTHER: Incidental note is again made of a ventricular shunt.

OTHER: 3-D  reconstructions confirm findings.



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  Up to 50% stenosis of the left interna
l carotid artery and left common carotid artery.  No significant stenosis of the right carotid or meng
tebral arteries.



COMMENT:  Quality ID #195: Measurements of distal internal carotid diameter were used as the denomina
tor for stenosis measurement.



TECHNICAL DOCUMENTATION:  JOB ID:  [id number]

Quality ID # 436: Final reports with documentation of one or more dose reduction techniques (e.g., Au
tomated exposure control, adjustment of the mA and/or kV according to patient size, use of iterative 
reconstruction technique)

 2011 [employer]- All Rights Reserved



Reading location - IP/workstation name: [email or web address]

## 2018-06-17 NOTE — EKG REPORT
SEVERITY:- BORDERLINE ECG -

SINUS TACHYCARDIA

BORDERLINE LEFT AXIS DEVIATION

MINIMAL ST DEPRESSION, LATERAL LEADS

:

Confirmed by: Stanley Velazquez MD 17-Jun-2018 15:50:22

## 2018-06-17 NOTE — RADIOLOGY REPORT (SQ)
EXAM DESCRIPTION:  CHEST SINGLE VIEW



COMPLETED DATE/TIME:  6/17/2018 1:32 pm



REASON FOR STUDY:  stroke-like symptoms



COMPARISON:  CT chest 11/8/2017



EXAM PARAMETERS:  NUMBER OF VIEWS: One view.

TECHNIQUE: Single frontal radiographic view of the chest acquired.

RADIATION DOSE: NA

LIMITATIONS: None.



FINDINGS:  LUNGS AND PLEURA: No opacities, masses or pneumothorax. No pleural effusion.

MEDIASTINUM AND HILAR STRUCTURES: No masses.  Contour normal.

HEART AND VASCULAR STRUCTURES: Mild cardiomegaly

BONES: No acute findings.

HARDWARE: Faintly radiopaque ventriculoperitoneal shunt tubing is present over the right chest

OTHER: No other significant finding.



IMPRESSION:  NO ACUTE RADIOGRAPHIC FINDING IN THE CHEST.



TECHNICAL DOCUMENTATION:  JOB ID:  [id number]

 2011 [employer]- All Rights Reserved



Reading location - IP/workstation name: [email or web address]

## 2018-06-17 NOTE — RADIOLOGY REPORT (SQ)
EXAM DESCRIPTION:  CTA HEAD



COMPLETED DATE/TIME:  6/17/2018 5:09 pm



REASON FOR STUDY:  cva



COMPARISON:  Noncontrast head CT 6/17/2018



TECHNIQUE:  Post IV contrast scanning, thin section axial imaging through the brain to evaluate the a
rterial structures.  Source and MIP images are saved and reviewed on PACS.

Advanced 3D imaging as volume-rendering, MIPs, SSD performed? yes

All CT scanners at this facility use dose modulation, iterative reconstruction, and/or weight based d
osing when appropriate to reduce radiation dose to as low as reasonably achievable (ALARA).

[facility]: Dose Right  [facility]: CareDose    [facility]: Dose Right    [facility]: Teradose 4D    [facility]: Smart Technologies



CONTRAST TYPE AND DOSE:  Examination performed in conjunction with CTA neck.



RENAL FUNCTION:  BUN 14; creatinine 0.91



LIMITATIONS:  None.



FINDINGS:  [place]: The anterior, middle, posterior cerebral arteries are all patent.  No ev
idence of aneurysm or focal stenosis.  Incidental note is made of calcific plaque within the cavernou
s segments of the internal carotid arteries.

POSTERIOR CIRCULATION: The left distal vertebral artery is occluded.  The right vertebral artery and 
basilar artery remain patent.  The superior cerebellar and posterior cerebral arteries appear patent.


BRAIN: No gross enhancing lesions as visualized.  The superior cerebral hemispheres are not included 
in the field of view.

BONES: Intact as visualized.

SINUSES: No fluid or mucosal thickening.

OTHER: Incidental note is made of a left ventricular shunt.



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  Normal opacification of the [place]
 [place].



TECHNICAL DOCUMENTATION:  JOB ID:  [id number]

Quality ID # 436: Final reports with documentation of one or more dose reduction techniques (e.g., Au
tomated exposure control, adjustment of the mA and/or kV according to patient size, use of iterative 
reconstruction technique)

 2011 [employer]- All Rights Reserved



Reading location - IP/workstation name: [email or web address]

## 2018-06-18 LAB
ANION GAP SERPL CALC-SCNC: 19 MMOL/L (ref 5–19)
BUN SERPL-MCNC: 14 MG/DL (ref 7–20)
CALCIUM: 9.7 MG/DL (ref 8.4–10.2)
CHLORIDE SERPL-SCNC: 99 MMOL/L (ref 98–107)
CHOLEST SERPL-MCNC: 271.95 MG/DL (ref 0–200)
CK MB SERPL-MCNC: 2.73 NG/ML (ref ?–4.55)
CK MB SERPL-MCNC: 3.1 NG/ML (ref ?–4.55)
CO2 SERPL-SCNC: 18 MMOL/L (ref 22–30)
ERYTHROCYTE [DISTWIDTH] IN BLOOD BY AUTOMATED COUNT: 13.2 % (ref 11.5–14)
GLUCOSE SERPL-MCNC: 229 MG/DL (ref 75–110)
HCT VFR BLD CALC: 51.4 % (ref 37.9–51)
HGB BLD-MCNC: 17.8 G/DL (ref 13.5–17)
LDLC SERPL DIRECT ASSAY-MCNC: 187 MG/DL (ref ?–100)
MCH RBC QN AUTO: 30.9 PG (ref 27–33.4)
MCHC RBC AUTO-ENTMCNC: 34.6 G/DL (ref 32–36)
MCV RBC AUTO: 89 FL (ref 80–97)
PLATELET # BLD: 249 10^3/UL (ref 150–450)
POTASSIUM SERPL-SCNC: 4.3 MMOL/L (ref 3.6–5)
RBC # BLD AUTO: 5.76 10^6/UL (ref 4.35–5.55)
SODIUM SERPL-SCNC: 135.9 MMOL/L (ref 137–145)
TRIGL SERPL-MCNC: 178 MG/DL (ref ?–150)
TROPONIN I SERPL-MCNC: 0.56 NG/ML
TROPONIN I SERPL-MCNC: 0.61 NG/ML
VLDLC SERPL CALC-MCNC: 35.6 MG/DL (ref 10–31)
WBC # BLD AUTO: 15.5 10^3/UL (ref 4–10.5)

## 2018-06-18 RX ADMIN — INSULIN LISPRO PRN UNIT: 100 INJECTION, SOLUTION INTRAVENOUS; SUBCUTANEOUS at 13:42

## 2018-06-18 RX ADMIN — Medication SCH ML: at 06:21

## 2018-06-18 RX ADMIN — PANTOPRAZOLE SODIUM SCH MG: 40 INJECTION, POWDER, FOR SOLUTION INTRAVENOUS at 10:33

## 2018-06-18 RX ADMIN — Medication SCH ML: at 13:45

## 2018-06-18 RX ADMIN — TAMSULOSIN HYDROCHLORIDE SCH MG: 0.4 CAPSULE ORAL at 11:51

## 2018-06-18 RX ADMIN — HEPARIN SODIUM SCH UNIT: 5000 INJECTION, SOLUTION INTRAVENOUS; SUBCUTANEOUS at 21:55

## 2018-06-18 RX ADMIN — ASPIRIN SCH MG: 81 TABLET, CHEWABLE ORAL at 10:32

## 2018-06-18 RX ADMIN — SODIUM CHLORIDE PRN ML: 9 INJECTION, SOLUTION INTRAVENOUS at 09:12

## 2018-06-18 RX ADMIN — Medication SCH ML: at 22:46

## 2018-06-18 RX ADMIN — ATORVASTATIN CALCIUM SCH MG: 20 TABLET, FILM COATED ORAL at 21:55

## 2018-06-18 RX ADMIN — CLOPIDOGREL BISULFATE SCH MG: 75 TABLET, FILM COATED ORAL at 10:32

## 2018-06-18 RX ADMIN — GABAPENTIN SCH MG: 100 CAPSULE ORAL at 17:00

## 2018-06-18 RX ADMIN — INSULIN LISPRO PRN UNIT: 100 INJECTION, SOLUTION INTRAVENOUS; SUBCUTANEOUS at 09:08

## 2018-06-18 RX ADMIN — INSULIN LISPRO PRN UNIT: 100 INJECTION, SOLUTION INTRAVENOUS; SUBCUTANEOUS at 16:58

## 2018-06-18 RX ADMIN — GABAPENTIN SCH MG: 100 CAPSULE ORAL at 13:42

## 2018-06-18 RX ADMIN — LORAZEPAM PRN MG: 0.5 TABLET ORAL at 16:34

## 2018-06-18 RX ADMIN — ESCITALOPRAM OXALATE SCH MG: 10 TABLET, FILM COATED ORAL at 10:32

## 2018-06-18 NOTE — PROGRESS NOTE E
Progress Note



NAME: OJVI VITALE

MRN: [id number]

: 1950             AGE: 67Y

DATE: 2018            ROOM: 302



SUBJECTIVE:

The patient was seen earlier today on rounds.  The patient is not very

responsive.  The patient interacts better with his wife but is unable to

follow any commands.  He is completely aphasic for me.  The patient was

able to cooperate though for speech evaluation which was impressive.  The

patient is unable to articulate any concerns at this time.



I had a lengthy discussion with the patient's wife regarding findings. 

Did reach out to [facility] to discuss the case.  The patient

continues on antiplatelet therapy as well as aspirin and a full dose of

statin therapy.  The patient is unable to articulate any concerns at this

time.



REVIEW OF SYSTEMS:

The rest of the review of systems is unobtainable.



MEDICATIONS:

Medications have been reviewed.



OBJECTIVE:

GENERAL:  The patient is a 67-year-old  male who is awake, alert.

Unable to fully assess orientation.  He does not appear to be distressed.



VITAL SIGNS:  Temperature is 98.1, pulse 89, respirations 18, blood

pressure 169/96, oxygen saturation is 98% on 2 liters nasal cannula.



SKIN:  Warm and dry.  No rash.  He is not diaphoretic.



HEENT:  Pupils are reactive.  Conjunctivae are pink.  There is no evidence

of JVP.



CARDIOVASCULAR:  Heart is regular.  No rub.



CHEST:  The patient does have some rhonchorous breath sounds in upper lung

fields, symmetrical, unlabored.



ABDOMEN:  Soft, nontender, nondistended.



EXTREMITIES:  No clubbing, cyanosis, edema.



PSYCHIATRIC:  Unable to fully assess.



NEUROLOGIC:  The patient's right side is near flaccid, does have some

strength in the right lower extremity but still quite weak.  The patient

is unable to follow commands for me.



DIAGNOSTICS:

Lab values are as follows - hematology obtained on 18; WBC is 54.5,

hemoglobin is 17.8, hematocrit is 51.4, platelet count of 249,000.



Chemistry obtained on 2018; sodium is 135, potassium 4.3, chloride

99, carbon dioxide 18, BUN 14, creatinine is 1.74, glucose 329, calcium is

9.7.  Triglycerides are 178, cholesterol is 271, , VLDL is 35.6,

HDL is 41.



IMPRESSION AND PLAN:

1.  LEFT POSTERIOR TEMPORAL CORTEX ISCHEMIC ACUTE CVA.  CTA of the head

and neck were consistent with occlusion of the intracranial left vertebral

artery which is contralateral at this point.  The patient does have some

stenosis of his carotids but only at 50%.  The patient appears to overall

be of poor condition and the family is aware that rehab is the patient's

most important step.  The patient has been allowed permissive hypertension

now for 24 hours.  Will continue for yet another 24 and follow.

2.  POORLY CONTROLLED DIABETES MELLITUS TYPE 2.  The patient has been on

multiple medications.  He has had poor dietary compliance.  Hopefully can

achieve a little better glycemic control at this point.

3.  HYPERTENSION.  The patient is still within his permissive hypertension

window.  Will continue to work on good blood pressure control prior to

discharge and follow.

4.  CORONARY ARTERY DISEASE.  The patient has been stented in the past. 

He is on baby aspirin a day as well as Plavix.  Will follow.

5.  DEPRESSION/ANXIETY.  Continue the patient's homes medications.

6.  CHRONIC PAIN.  Continue his home medications.

7.  DYSLIPIDEMIA.  Again the patient is on full dose statin.

8.  HIGH RISK FOR ASPIRATION.  There is concern for aspiration, although

the patient did well with his swallow study today and recommendations have

been made.  The patient did have a slight bump in white count and very low

grade temperature at 99.3.  I am concerned with this rattle in the

patient's chest, therefore, we will obtain a chest x-ray and follow.  My

threshold for covering the patient for aspiration pneumonia at this time

is quite low.



CODE STATUS:

The patient is a do not resuscitate/do not intubate as the patient's wife

is completely clear about his desire for natural death.



DISPOSITION:

Depending on the patient's symptomatology and diagnostic findings will

reevaluate in the a.m.



TIME SPENT:

On this follow up, including assessment and plan, physical examination,

patient education, review of records is 40 minutes.







DICTATING PHYSICIAN:  JOVI CORTEZ NP





[id number]                  DT: 2018    2204

PHY#: [id number]            DD: 2018    183

ID:   [id number]           JOB#: [id number]       ACCT: [id number]



cc:

>







[facility]

## 2018-06-19 LAB
ANION GAP SERPL CALC-SCNC: 18 MMOL/L (ref 5–19)
BUN SERPL-MCNC: 21 MG/DL (ref 7–20)
CALCIUM: 9.1 MG/DL (ref 8.4–10.2)
CHLORIDE SERPL-SCNC: 96 MMOL/L (ref 98–107)
CO2 SERPL-SCNC: 18 MMOL/L (ref 22–30)
ERYTHROCYTE [DISTWIDTH] IN BLOOD BY AUTOMATED COUNT: 13.5 % (ref 11.5–14)
GLUCOSE SERPL-MCNC: 203 MG/DL (ref 75–110)
HCT VFR BLD CALC: 49.2 % (ref 37.9–51)
HGB BLD-MCNC: 17.2 G/DL (ref 13.5–17)
MCH RBC QN AUTO: 30.9 PG (ref 27–33.4)
MCHC RBC AUTO-ENTMCNC: 35 G/DL (ref 32–36)
MCV RBC AUTO: 88 FL (ref 80–97)
PLATELET # BLD: 243 10^3/UL (ref 150–450)
POTASSIUM SERPL-SCNC: 4.3 MMOL/L (ref 3.6–5)
RBC # BLD AUTO: 5.59 10^6/UL (ref 4.35–5.55)
SODIUM SERPL-SCNC: 131.8 MMOL/L (ref 137–145)
WBC # BLD AUTO: 13.2 10^3/UL (ref 4–10.5)

## 2018-06-19 RX ADMIN — INSULIN DETEMIR SCH UNIT: 100 INJECTION, SOLUTION SUBCUTANEOUS at 22:40

## 2018-06-19 RX ADMIN — ZOLPIDEM TARTRATE PRN MG: 5 TABLET ORAL at 22:40

## 2018-06-19 RX ADMIN — Medication SCH ML: at 23:33

## 2018-06-19 RX ADMIN — Medication SCH ML: at 14:15

## 2018-06-19 RX ADMIN — HEPARIN SODIUM SCH UNIT: 5000 INJECTION, SOLUTION INTRAVENOUS; SUBCUTANEOUS at 22:40

## 2018-06-19 RX ADMIN — Medication SCH ML: at 05:38

## 2018-06-19 RX ADMIN — INSULIN LISPRO PRN UNIT: 100 INJECTION, SOLUTION INTRAVENOUS; SUBCUTANEOUS at 17:16

## 2018-06-19 RX ADMIN — GABAPENTIN SCH MG: 100 CAPSULE ORAL at 10:12

## 2018-06-19 RX ADMIN — MONTELUKAST SODIUM SCH MG: 10 TABLET, FILM COATED ORAL at 10:12

## 2018-06-19 RX ADMIN — TAMSULOSIN HYDROCHLORIDE SCH MG: 0.4 CAPSULE ORAL at 10:12

## 2018-06-19 RX ADMIN — PANTOPRAZOLE SODIUM SCH MG: 40 INJECTION, POWDER, FOR SOLUTION INTRAVENOUS at 10:12

## 2018-06-19 RX ADMIN — ATORVASTATIN CALCIUM SCH MG: 20 TABLET, FILM COATED ORAL at 22:40

## 2018-06-19 RX ADMIN — INSULIN LISPRO PRN UNIT: 100 INJECTION, SOLUTION INTRAVENOUS; SUBCUTANEOUS at 08:31

## 2018-06-19 RX ADMIN — HEPARIN SODIUM SCH UNIT: 5000 INJECTION, SOLUTION INTRAVENOUS; SUBCUTANEOUS at 14:14

## 2018-06-19 RX ADMIN — ASPIRIN SCH MG: 81 TABLET, CHEWABLE ORAL at 10:12

## 2018-06-19 RX ADMIN — CLOPIDOGREL BISULFATE SCH MG: 75 TABLET, FILM COATED ORAL at 10:12

## 2018-06-19 RX ADMIN — ESCITALOPRAM OXALATE SCH MG: 10 TABLET, FILM COATED ORAL at 10:12

## 2018-06-19 RX ADMIN — GABAPENTIN SCH MG: 100 CAPSULE ORAL at 14:15

## 2018-06-19 RX ADMIN — GABAPENTIN SCH MG: 100 CAPSULE ORAL at 17:16

## 2018-06-19 RX ADMIN — BUSPIRONE HYDROCHLORIDE SCH MG: 10 TABLET ORAL at 14:14

## 2018-06-19 RX ADMIN — LANSOPRAZOLE SCH MG: 30 TABLET, ORALLY DISINTEGRATING, DELAYED RELEASE ORAL at 05:28

## 2018-06-19 RX ADMIN — LORAZEPAM PRN MG: 0.5 TABLET ORAL at 08:31

## 2018-06-19 RX ADMIN — HEPARIN SODIUM SCH UNIT: 5000 INJECTION, SOLUTION INTRAVENOUS; SUBCUTANEOUS at 05:28

## 2018-06-19 RX ADMIN — INSULIN LISPRO PRN UNIT: 100 INJECTION, SOLUTION INTRAVENOUS; SUBCUTANEOUS at 12:49

## 2018-06-19 RX ADMIN — BUSPIRONE HYDROCHLORIDE SCH MG: 10 TABLET ORAL at 17:16

## 2018-06-19 RX ADMIN — LORAZEPAM PRN MG: 0.5 TABLET ORAL at 17:16

## 2018-06-19 NOTE — PDOC PROGRESS REPORT
Subjective


Progress Note for:: 06/19/18


Subjective:: 


Patient is seen resting in bed.  Wife is at the bedside.  He continues to be 

aphasic.  He will occasionally shake his head yes or no to questions.  He is 

able to swallow better today.  Is now tolerating a diet and taking his pills.  

He is some improved strength in the right arm and right leg.  Unable to do 

review of systems due to his present status.  Condition was discussed with the 

patient and his wife.  Emotional support was given.  Wife is quite tearful 

today.  We discussed options for post discharge care.


Reason For Visit: 


CVA








Physical Exam


Vital Signs: 


 











Temp Pulse Resp BP Pulse Ox


 


 97.7 F   86   18   157/92 H  100 


 


 06/19/18 07:51  06/19/18 07:51  06/19/18 07:51  06/19/18 07:51  06/19/18 08:44








 Intake & Output











 06/18/18 06/19/18 06/20/18





 06:59 06:59 06:59


 


Intake Total 800 1253 


 


Output Total 2400 1600 


 


Balance -1600 -347 


 


Weight 65.4 kg 93.1 kg 











General appearance: PRESENT: no acute distress, obese, well-developed, well-

nourished


Head exam: PRESENT: atraumatic, normocephalic


Eye exam: PRESENT: conjunctiva pink, EOMI, PERRLA.  ABSENT: scleral icterus


Ear exam: PRESENT: normal external ear exam


Mouth exam: PRESENT: moist, tongue midline


Neck exam: ABSENT: carotid bruit, JVD, lymphadenopathy, thyromegaly


Respiratory exam: PRESENT: clear to auscultation orlin.  ABSENT: rales, rhonchi, 

wheezes


Cardiovascular exam: PRESENT: RRR.  ABSENT: diastolic murmur, rubs, systolic 

murmur


Pulses: PRESENT: normal dorsalis pedis pul


Vascular exam: PRESENT: normal capillary refill


GI/Abdominal exam: PRESENT: normal bowel sounds, soft.  ABSENT: distended, 

guarding, mass, organolmegaly, rebound, tenderness


Rectal exam: PRESENT: deferred


Extremities exam: PRESENT: other - 2/5 muscle strength in right extremities, 5/

5 the left.  ABSENT: calf tenderness, clubbing, pedal edema


Musculoskeletal exam: PRESENT: other - As above.


Neurological exam: PRESENT: alert, awake, CN II-XII grossly intact, aphasic, 

other - 2/5 Muscle strength in right-sided extremities, 5/5 on left


Psychiatric exam: PRESENT: appropriate affect, normal mood


Skin exam: PRESENT: dry, intact, warm.  ABSENT: cyanosis, rash





Results


Laboratory Results: 


 





 06/19/18 04:00 





 06/19/18 04:00 





 











  06/19/18 06/19/18





  04:00 04:00


 


WBC   13.2 H


 


RBC   5.59 H


 


Hgb   17.2 H


 


Hct   49.2


 


MCV   88


 


MCH   30.9


 


MCHC   35.0


 


RDW   13.5


 


Plt Count   243


 


Sodium  131.8 L 


 


Potassium  4.3 


 


Chloride  96 L 


 


Carbon Dioxide  18 L 


 


Anion Gap  18 


 


BUN  21 H 


 


Creatinine  0.63 


 


Est GFR ( Amer)  > 60 


 


Est GFR (Non-Af Amer)  > 60 


 


Glucose  203 H 


 


Calcium  9.1 








 











  06/17/18 06/17/18 06/18/18





  19:55 19:55 01:55


 


Creatine Kinase  89   124


 


CK-MB (CK-2)   2.80 


 


Troponin I   0.348 














  06/18/18 06/18/18 06/18/18





  01:55 07:49 07:49


 


Creatine Kinase   138 


 


CK-MB (CK-2)  2.73   3.10


 


Troponin I  0.563   0.613











Impressions: 


 





Head CTA  06/17/18 00:00


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  Normal 

opacification of the [place].


 








Neck CTA  06/17/18 00:00


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  Up to 50% 

stenosis of the left internal carotid artery and left common carotid artery.  

No significant stenosis of the right carotid or vertebral arteries.


 








Head CT  06/17/18 13:25


IMPRESSION:  Acute or early subacute nonhemorrhagic infarct left posterior 

temporal cortex and subcortical white matter.


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  No 

hydrocephalus.  Intraventricular drainage catheters are unchanged from prior 

studies.


EVIDENCE OF ACUTE STROKE: Yes


 








Chest X-Ray  06/19/18 06:00


IMPRESSION:  NO ACUTE RADIOGRAPHIC FINDING IN THE CHEST.


 














Assessment & Plan





- Diagnosis


(1) Ischemic stroke


Is this a current diagnosis for this admission?: Yes   


Plan: 


Patient is presently on aspirin, Plavix and statin.  Was discussed with [facility] 

[facility].  PT, speech, occupational therapy and case management are consulted.

  Patient will need at least short-term rehab post discharge.. He is not safe 

for discharge to home.  Discussed with wife and patient.  All their questions 

were answered to their satisfaction.








(2) Coronary artery disease


Is this a current diagnosis for this admission?: Yes   


Plan: 


Continue aspirin, Plavix and statin








(3) Depression with anxiety


Is this a current diagnosis for this admission?: Yes   


Plan: 


Continue home medications.  Restart BuSpar








(4) Hypertension


Qualifiers: 


   Hypertension type: essential hypertension   Qualified Code(s): I10 - 

Essential (primary) hypertension   


Is this a current diagnosis for this admission?: Yes   


Plan: 


He is presently normotensive on current medications.








(5) Morbid obesity


Is this a current diagnosis for this admission?: Yes   


Plan: 


Counseled








(6) Poorly controlled type 2 diabetes mellitus


Is this a current diagnosis for this admission?: Yes   


Plan: 


We will restart basal insulin now that he is eating.  Continue sliding scale.  

Dose adjustment as is warranted








(7) Chronic pain


Qualifiers: 


   Chronic pain type: chronic pain syndrome   Qualified Code(s): G89.4 - 

Chronic pain syndrome   


Is this a current diagnosis for this admission?: Yes   


Plan: 


We will continue home opiate analgesic








- Time


Time Spent with patient: 25-34 minutes


Total Critical Time (Minutes): 20


Medications reviewed and adjusted accordingly: Yes


Anticipated discharge: Acute Rehab


Within: when bed available





- Inpatient Certification


Medical Necessity: Need for Neurological Checks, Risk of Complication if Not 

Cared For in Hospital

## 2018-06-19 NOTE — RADIOLOGY REPORT (SQ)
EXAM DESCRIPTION:  CHEST SINGLE VIEW



COMPLETED DATE/TIME:  6/19/2018 7:46 am



REASON FOR STUDY:  possible aspiration



COMPARISON:  AP chest 6/17/2018

CT abdomen pelvis 11/8/2017



EXAM PARAMETERS:  NUMBER OF VIEWS: One view.

TECHNIQUE: Single frontal radiographic view of the chest acquired.

RADIATION DOSE: NA

LIMITATIONS: None.



FINDINGS:  LUNGS AND PLEURA: No opacities, masses or pneumothorax. No pleural effusion.

MEDIASTINUM AND HILAR STRUCTURES: No masses.  Contour normal.

HEART AND VASCULAR STRUCTURES: Mild cardiomegaly

BONES: No acute findings.

HARDWARE: Faintly radiopaque right-sided ventriculoperitoneal shunt tubing over the chest.

OTHER: No other significant finding.



IMPRESSION:  NO ACUTE RADIOGRAPHIC FINDING IN THE CHEST.



TECHNICAL DOCUMENTATION:  JOB ID:  [id number]

 2011 [employer]- All Rights Reserved



Reading location - IP/workstation name: [facility]

## 2018-06-20 LAB
ANION GAP SERPL CALC-SCNC: 13 MMOL/L (ref 5–19)
BUN SERPL-MCNC: 18 MG/DL (ref 7–20)
CALCIUM: 8.9 MG/DL (ref 8.4–10.2)
CHLORIDE SERPL-SCNC: 95 MMOL/L (ref 98–107)
CO2 SERPL-SCNC: 22 MMOL/L (ref 22–30)
GLUCOSE SERPL-MCNC: 230 MG/DL (ref 75–110)
POTASSIUM SERPL-SCNC: 3.9 MMOL/L (ref 3.6–5)
SODIUM SERPL-SCNC: 130 MMOL/L (ref 137–145)

## 2018-06-20 RX ADMIN — HEPARIN SODIUM SCH UNIT: 5000 INJECTION, SOLUTION INTRAVENOUS; SUBCUTANEOUS at 15:09

## 2018-06-20 RX ADMIN — ATORVASTATIN CALCIUM SCH MG: 20 TABLET, FILM COATED ORAL at 21:51

## 2018-06-20 RX ADMIN — GABAPENTIN SCH MG: 100 CAPSULE ORAL at 17:44

## 2018-06-20 RX ADMIN — INSULIN DETEMIR SCH UNIT: 100 INJECTION, SOLUTION SUBCUTANEOUS at 21:51

## 2018-06-20 RX ADMIN — Medication SCH ML: at 21:56

## 2018-06-20 RX ADMIN — TAMSULOSIN HYDROCHLORIDE SCH MG: 0.4 CAPSULE ORAL at 10:14

## 2018-06-20 RX ADMIN — HEPARIN SODIUM SCH UNIT: 5000 INJECTION, SOLUTION INTRAVENOUS; SUBCUTANEOUS at 21:51

## 2018-06-20 RX ADMIN — LANSOPRAZOLE SCH MG: 30 TABLET, ORALLY DISINTEGRATING, DELAYED RELEASE ORAL at 05:31

## 2018-06-20 RX ADMIN — GABAPENTIN SCH MG: 100 CAPSULE ORAL at 15:09

## 2018-06-20 RX ADMIN — INSULIN LISPRO PRN UNIT: 100 INJECTION, SOLUTION INTRAVENOUS; SUBCUTANEOUS at 08:32

## 2018-06-20 RX ADMIN — ESCITALOPRAM OXALATE SCH MG: 10 TABLET, FILM COATED ORAL at 10:14

## 2018-06-20 RX ADMIN — CLOPIDOGREL BISULFATE SCH MG: 75 TABLET, FILM COATED ORAL at 10:14

## 2018-06-20 RX ADMIN — INSULIN LISPRO PRN UNIT: 100 INJECTION, SOLUTION INTRAVENOUS; SUBCUTANEOUS at 17:44

## 2018-06-20 RX ADMIN — Medication SCH ML: at 15:10

## 2018-06-20 RX ADMIN — ZOLPIDEM TARTRATE PRN MG: 5 TABLET ORAL at 23:18

## 2018-06-20 RX ADMIN — BUSPIRONE HYDROCHLORIDE SCH MG: 10 TABLET ORAL at 17:44

## 2018-06-20 RX ADMIN — INSULIN LISPRO PRN UNIT: 100 INJECTION, SOLUTION INTRAVENOUS; SUBCUTANEOUS at 12:28

## 2018-06-20 RX ADMIN — Medication SCH ML: at 06:24

## 2018-06-20 RX ADMIN — GABAPENTIN SCH MG: 100 CAPSULE ORAL at 10:14

## 2018-06-20 RX ADMIN — ASPIRIN SCH MG: 81 TABLET, CHEWABLE ORAL at 10:13

## 2018-06-20 RX ADMIN — MONTELUKAST SODIUM SCH MG: 10 TABLET, FILM COATED ORAL at 10:14

## 2018-06-20 RX ADMIN — LORAZEPAM PRN MG: 0.5 TABLET ORAL at 01:20

## 2018-06-20 RX ADMIN — BUSPIRONE HYDROCHLORIDE SCH MG: 10 TABLET ORAL at 15:09

## 2018-06-20 RX ADMIN — HEPARIN SODIUM SCH UNIT: 5000 INJECTION, SOLUTION INTRAVENOUS; SUBCUTANEOUS at 05:31

## 2018-06-20 RX ADMIN — BUSPIRONE HYDROCHLORIDE SCH MG: 10 TABLET ORAL at 10:14

## 2018-06-20 NOTE — PDOC PROGRESS REPORT
Subjective


Progress Note for:: 06/20/18


Subjective:: 


Patient is a 5-year-old male past medical history significant for uncontrolled 

diabetes, obesity, hypertension  who was admitted on 6/17/18 for CVA.





Patient seen on morning rounds with his wife present.  He is found resting in 

bed comfortably on room air.  He is awake and alert; attempting to participate 

in conversation but obviously frustrated by his inability to speak clearly.  He 

does follow directions and is capable of straight leg lift bilaterally.  He is 

able to reach with his right hand but has poor coordination.


The wife is bothered by the patient's frustration, labile mood, and increased 

anger.  We discussed how stroke can cause personality and behavioral changes; 

patient was encouraged to continue to make attempts to move and speak.  They 

were both reassured that with physical therapy he may regain some function.





Reason For Visit: 


CVA








Physical Exam


Vital Signs: 


 











Temp Pulse Resp BP Pulse Ox


 


 97.6 F   79   12   124/68   93 


 


 06/20/18 16:29  06/20/18 16:29  06/20/18 16:29  06/20/18 16:29  06/20/18 16:29








 Intake & Output











 06/19/18 06/20/18 06/21/18





 06:59 06:59 06:59


 


Intake Total 1253 30 670


 


Output Total 1600 2300 460


 


Balance -347 -2270 210


 


Weight 93.1 kg 93.1 kg 











General appearance: PRESENT: no acute distress, obese, well-developed, well-

nourished


Head exam: PRESENT: atraumatic, normocephalic


Eye exam: PRESENT: conjunctiva pink, EOMI, PERRLA.  ABSENT: scleral icterus


Mouth exam: PRESENT: moist, tongue midline


Neck exam: ABSENT: carotid bruit, JVD, lymphadenopathy, thyromegaly


Respiratory exam: PRESENT: clear to auscultation orlin.  ABSENT: rales, rhonchi, 

wheezes


Cardiovascular exam: PRESENT: RRR.  ABSENT: diastolic murmur, rubs, systolic 

murmur


Pulses: PRESENT: normal dorsalis pedis pul


Vascular exam: PRESENT: normal capillary refill


GI/Abdominal exam: PRESENT: normal bowel sounds, soft.  ABSENT: distended, 

guarding, mass, organolmegaly, rebound, tenderness


Rectal exam: PRESENT: deferred


Extremities exam: PRESENT: full ROM.  ABSENT: calf tenderness, clubbing, pedal 

edema


Neurological exam: PRESENT: alert, awake, oriented to person, CN II-XII grossly 

intact, other - 2/5 RUE, 3/5 RLE, 5/5 on left.  ABSENT: motor sensory deficit


Psychiatric exam: PRESENT: appropriate affect, normal mood.  ABSENT: homicidal 

ideation, suicidal ideation


Skin exam: PRESENT: dry, intact, warm.  ABSENT: cyanosis, rash





Results


Laboratory Results: 


 





 06/19/18 04:00 





 06/20/18 04:19 





 











  06/20/18





  04:19


 


Sodium  130.0 L


 


Potassium  3.9


 


Chloride  95 L


 


Carbon Dioxide  22


 


Anion Gap  13


 


BUN  18


 


Creatinine  0.67


 


Est GFR ( Amer)  > 60


 


Est GFR (Non-Af Amer)  > 60


 


Glucose  230 H


 


Calcium  8.9








 











  06/17/18 06/17/18 06/18/18





  19:55 19:55 01:55


 


Creatine Kinase  89   124


 


CK-MB (CK-2)   2.80 


 


Troponin I   0.348 














  06/18/18 06/18/18 06/18/18





  01:55 07:49 07:49


 


Creatine Kinase   138 


 


CK-MB (CK-2)  2.73   3.10


 


Troponin I  0.563   0.613











Impressions: 


 





Head CTA  06/17/18 00:00


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  Normal 

opacification of the [place].


 








Neck CTA  06/17/18 00:00


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  Up to 50% 

stenosis of the left internal carotid artery and left common carotid artery.  

No significant stenosis of the right carotid or vertebral arteries.


 








Head CT  06/17/18 13:25


IMPRESSION:  Acute or early subacute nonhemorrhagic infarct left posterior 

temporal cortex and subcortical white matter.


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  No 

hydrocephalus.  Intraventricular drainage catheters are unchanged from prior 

studies.


EVIDENCE OF ACUTE STROKE: Yes


 








Chest X-Ray  06/19/18 06:00


IMPRESSION:  NO ACUTE RADIOGRAPHIC FINDING IN THE CHEST.


 














Assessment & Plan





- Diagnosis


(1) Ischemic stroke


Is this a current diagnosis for this admission?: Yes   


Plan: 


Nonhemorrhagic left posterior temporal cortex stroke.


Unable to obtain MRI secondary to ventricular shunts.


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 

with normal [place].


Echocardiogram is benign.


Patient has remained in normal sinus rhythm throughout admission.


Hemoglobin A1c 9.1%


Lipid panel is suboptimal; HDL 41, , triglycerides 178, total 

cholesterol 271.





Continue aspirin, Plavix, and statin therapy.  


Aggressive diabetes management.


Registered dietitian and patient educator are asked to see the patient.


PT/OT/ST therapy are consulted.


At this time, patient is stable for discharge to SNF for acute rehabilitation.








(2) Coronary artery disease


Is this a current diagnosis for this admission?: Yes   


Plan: 


Continue aspirin, Plavix, statin therapy.


Remaining plan as above.








(3) Depression with anxiety


Is this a current diagnosis for this admission?: Yes   


Plan: 


Continue home medications.








(4) Hypertension


Qualifiers: 


   Hypertension type: essential hypertension   Qualified Code(s): I10 - 

Essential (primary) hypertension   


Is this a current diagnosis for this admission?: Yes   


Plan: 


The patient is currently normotensive.











(5) Poorly controlled type 2 diabetes mellitus


Is this a current diagnosis for this admission?: Yes   


Plan: 


Hemoglobin A1c of 9.1%.


Consistent carb diet.


Accu-Cheks before meals and at bedtime with Humalog for sliding scale coverage.


Levemir 30 units nightly.


Registered dietitian and diabetic educator have been consulted.








(6) Chronic pain


Qualifiers: 


   Chronic pain type: chronic pain syndrome   Qualified Code(s): G89.4 - 

Chronic pain syndrome   


Is this a current diagnosis for this admission?: Yes   


Plan: 


We will continue the patient's home dose medications.








- Time


Time Spent with patient: 25-34 minutes


Medications reviewed and adjusted accordingly: Yes


Anticipated discharge: Acute Rehab


Within: when bed available

## 2018-06-20 NOTE — XCELERA REPORT
[address] [address]

                             Tel: [phone]

                             Fax: [phone]



                    Transthoracic Echocardiogram Report

____________________________________________________________________________



Name: JOVI VITALE

MRN: [id number]                Age: 67 yrs

Gender: Male                   : 1950

Patient Status: Inpatient      Patient Location: [address] [place] [address]

Account #: [id number]

Study Date: 2018 02:24 PM

Accession #: [id number]

____________________________________________________________________________



Height: 68 in        Weight: 205 lb        BSA: 2.1 m2



____________________________________________________________________________

Procedure: A complete two-dimensional transthoracic echocardiogram was

performed (2D, M-mode, spectral and color flow Doppler). The study was

technically difficult with many images being suboptimal in quality.

Reason For Study: CVA





Ordering Physician: MAXIMINO DOMINGUEZ

Performed By: aJrrod Benjamin

____________________________________________________________________________





Interpretation Summary

The left ventricular ejection fraction is normal.

There is borderline concentric left ventricular hypertrophy.

The left ventricle is grossly normal size.

Doppler measurements suggest pseudonormalized left ventricular relaxation,

which is associated with grade II/IV or mild to moderate diastolic

dysfunction

Wall motion cannot be accurately commented on, but no definite regional

wall motion abnormalities noted.

The right ventricle is grossly normal size.

Right ventricular function cannot be assessed due to poor image quality.

The right atrium is normal in size

The left atrial size is normal.

There is a trace amount of mitral regurgitation

There is no mitral valve stenosis.

There is no aortic valve stenosis

No aortic regurgitation is present.

There is a trace or physiologic amount of tricuspid regurgitation

Tricuspid regurgitation jet envelope not well defined to measure RV

systolic pressure accurately.

The aortic root is not well visualized.

The inferior vena cava appeared normal and decreased < 50% with respiration

(RAP 10-15 mmHg)

Minimal pericardial effusion.



____________________________________________________________________________



MMode/2D Measurements & Calculations

RVDd: 3.0 cm       LVIDd: 4.6 cm FS: 33.0 %          Ao root diam: 3.3 cm

IVSd: 0.90 cm      LVIDs: 3.1 cm EDV(Teich): 98.6 ml

                   LVPWd: 1.1 cm ESV(Teich): 37.9 ml Ao root area: 8.7 cm2

                                 EF(Teich): 61.5 %   LA dimension: 2.8 cm



        _____________________________________________________________

LVOT diam: 2.3 cm

LVOT area: 4.1 cm2





Doppler Measurements & Calculations

MV E max emerald:      MV P1/2t max emerald:     Ao V2 max:       LV V1 max P.7 cm/sec        84.7 cm/sec           156.4 cm/sec     2.9 mmHg

MV A max emerald:      MV P1/2t: 65.2 msec   Ao max PG:       LV V1 max:

114.7 cm/sec       MVA(P1/2t): 3.4 cm2   9.8 mmHg         84.4 cm/sec

MV E/A: 0.61       MV dec slope:         SHANA(V,D): 2.2 cm2

                   380.5 cm/sec2

                   MV dec time: 0.37 sec



        _____________________________________________________________

PA V2 max:

93.8 cm/sec

PA max PG: 3.5 mmHg



____________________________________________________________________________

Left Ventricle

The left ventricle is grossly normal size. There is borderline concentric

left ventricular hypertrophy. The left ventricular ejection fraction is

normal. Doppler measurements suggest pseudonormalized left ventricular

relaxation, which is associated with grade II/IV or mild to moderate

diastolic dysfunction. Wall motion cannot be accurately commented on, but

no definite regional wall motion abnormalities noted.



Right Ventricle

The right ventricle is grossly normal size. There is normal right

ventricular wall thickness. Right ventricular function cannot be assessed

due to poor image quality.



Atria

The right atrium is normal in size. The left atrial size is normal.

Interarterial septum not well visualized and not well dopplered. Cannot

comment on ASD/PFO presence.





Mitral Valve

There is mild mitral annular calcification. There is mild mitral leaflet

calcification. There is no mitral valve stenosis. There is a trace amount

of mitral regurgitation.



Aortic Valve

The aortic valve is mildly calcified. There is no aortic valve stenosis. No

aortic regurgitation is present.



Tricuspid Valve

The tricuspid valve is not well visualized secondary to technical

limitations. There is no tricuspid stenosis. There is a trace or

physiologic amount of tricuspid regurgitation. Tricuspid regurgitation jet

envelope not well defined to measure RV systolic pressure accurately.



Pulmonic Valve

The pulmonic valve is not well visualized.



Great Vessels

The aortic root is not well visualized. The inferior vena cava appeared

normal and decreased < 50% with respiration (RAP 10-15 mmHg).





Effusions

Minimal pericardial effusion.



Incidental Findings

No definite cardiac source of CVA/TIA noted on this particular trans-

thoracic study. Consider ANALIA if clinically indicated. May consider mobile

cardiac telemetry monitoring (MCT) for ruling out transient AFIB.



____________________________________________________________________________



Electronically signed by:      Harry Martines      on 2018 06:31 PM



CC: DEVYN DOMINGUEZC

>

Harry Martines

## 2018-06-21 LAB
ANION GAP SERPL CALC-SCNC: 12 MMOL/L (ref 5–19)
BUN SERPL-MCNC: 19 MG/DL (ref 7–20)
CALCIUM: 8.8 MG/DL (ref 8.4–10.2)
CHLORIDE SERPL-SCNC: 102 MMOL/L (ref 98–107)
CO2 SERPL-SCNC: 21 MMOL/L (ref 22–30)
ERYTHROCYTE [DISTWIDTH] IN BLOOD BY AUTOMATED COUNT: 13 % (ref 11.5–14)
GLUCOSE SERPL-MCNC: 287 MG/DL (ref 75–110)
HCT VFR BLD CALC: 49.6 % (ref 37.9–51)
HGB BLD-MCNC: 17.6 G/DL (ref 13.5–17)
MCH RBC QN AUTO: 31.4 PG (ref 27–33.4)
MCHC RBC AUTO-ENTMCNC: 35.5 G/DL (ref 32–36)
MCV RBC AUTO: 88 FL (ref 80–97)
PLATELET # BLD: 210 10^3/UL (ref 150–450)
POTASSIUM SERPL-SCNC: 4 MMOL/L (ref 3.6–5)
RBC # BLD AUTO: 5.62 10^6/UL (ref 4.35–5.55)
SODIUM SERPL-SCNC: 135.1 MMOL/L (ref 137–145)
WBC # BLD AUTO: 10.8 10^3/UL (ref 4–10.5)

## 2018-06-21 RX ADMIN — MONTELUKAST SODIUM SCH MG: 10 TABLET, FILM COATED ORAL at 09:59

## 2018-06-21 RX ADMIN — Medication SCH ML: at 08:45

## 2018-06-21 RX ADMIN — ZOLPIDEM TARTRATE PRN MG: 5 TABLET ORAL at 21:38

## 2018-06-21 RX ADMIN — HEPARIN SODIUM SCH UNIT: 5000 INJECTION, SOLUTION INTRAVENOUS; SUBCUTANEOUS at 21:42

## 2018-06-21 RX ADMIN — INSULIN LISPRO PRN UNIT: 100 INJECTION, SOLUTION INTRAVENOUS; SUBCUTANEOUS at 17:21

## 2018-06-21 RX ADMIN — ATORVASTATIN CALCIUM SCH MG: 20 TABLET, FILM COATED ORAL at 21:38

## 2018-06-21 RX ADMIN — ESCITALOPRAM OXALATE SCH MG: 10 TABLET, FILM COATED ORAL at 09:59

## 2018-06-21 RX ADMIN — GABAPENTIN SCH MG: 100 CAPSULE ORAL at 17:13

## 2018-06-21 RX ADMIN — INSULIN LISPRO PRN UNIT: 100 INJECTION, SOLUTION INTRAVENOUS; SUBCUTANEOUS at 08:44

## 2018-06-21 RX ADMIN — GABAPENTIN SCH MG: 100 CAPSULE ORAL at 13:53

## 2018-06-21 RX ADMIN — LANSOPRAZOLE SCH MG: 30 TABLET, ORALLY DISINTEGRATING, DELAYED RELEASE ORAL at 08:40

## 2018-06-21 RX ADMIN — BUSPIRONE HYDROCHLORIDE SCH MG: 10 TABLET ORAL at 09:59

## 2018-06-21 RX ADMIN — Medication SCH ML: at 13:56

## 2018-06-21 RX ADMIN — TAMSULOSIN HYDROCHLORIDE SCH MG: 0.4 CAPSULE ORAL at 10:00

## 2018-06-21 RX ADMIN — INSULIN LISPRO PRN UNIT: 100 INJECTION, SOLUTION INTRAVENOUS; SUBCUTANEOUS at 12:29

## 2018-06-21 RX ADMIN — HEPARIN SODIUM SCH UNIT: 5000 INJECTION, SOLUTION INTRAVENOUS; SUBCUTANEOUS at 08:43

## 2018-06-21 RX ADMIN — Medication SCH ML: at 21:42

## 2018-06-21 RX ADMIN — BUSPIRONE HYDROCHLORIDE SCH MG: 10 TABLET ORAL at 13:53

## 2018-06-21 RX ADMIN — ASPIRIN SCH MG: 81 TABLET, CHEWABLE ORAL at 10:00

## 2018-06-21 RX ADMIN — BUSPIRONE HYDROCHLORIDE SCH MG: 10 TABLET ORAL at 17:13

## 2018-06-21 RX ADMIN — GABAPENTIN SCH MG: 100 CAPSULE ORAL at 09:59

## 2018-06-21 RX ADMIN — HEPARIN SODIUM SCH UNIT: 5000 INJECTION, SOLUTION INTRAVENOUS; SUBCUTANEOUS at 13:53

## 2018-06-21 RX ADMIN — CLOPIDOGREL BISULFATE SCH MG: 75 TABLET, FILM COATED ORAL at 10:00

## 2018-06-21 NOTE — PDOC PROGRESS REPORT
Subjective


Progress Note for:: 06/21/18


Subjective:: 


Patient is a 5-year-old male past medical history significant for uncontrolled 

diabetes, obesity, hypertension  who was admitted on 6/17/18 for CVA.





Patient seen on afternoon rounds with his wife present.  He is found resting in 

bed comfortably on room air.  He is alert and oriented to self and situation.  

It is unclear if he is disoriented to place and time or simply unable to 

articulate.  Expressive the patient is significantly improved today with near 

complete sentences without slurred speech.


The patient was unable to walk today secondary to right hand pain.  The patient 

has developed erythema, edema, and severe pain to his right third MP joint (

suspicious for gout).  He denies injury.  His wife does report that he fell 

multiple times prior to coming to the emergency department so possibility of 

trauma to success.


Otherwise, the patient states that he is feeling well today and denies chest 

pain, palpitations, headache, and dyspnea.


They have no other questions or concerns at this time.


Reason For Visit: 


CVA








Physical Exam


Vital Signs: 


 











Temp Pulse Resp BP Pulse Ox


 


 97.5 F   89   13   124/90 H  98 


 


 06/21/18 15:12  06/21/18 15:12  06/21/18 15:12  06/21/18 15:12  06/21/18 15:12








 Intake & Output











 06/20/18 06/21/18 06/22/18





 06:59 06:59 06:59


 


Intake Total 30 1043 1320


 


Output Total 2300 460 1750


 


Balance -2270 583 -430


 


Weight 93.1 kg 94.9 kg 











General appearance: PRESENT: no acute distress, cooperative, obese, well-

developed, well-nourished


Head exam: PRESENT: atraumatic, normocephalic


Eye exam: PRESENT: conjunctiva pink, EOMI, PERRLA.  ABSENT: scleral icterus


Ear exam: PRESENT: normal external ear exam


Mouth exam: PRESENT: moist, tongue midline


Neck exam: ABSENT: carotid bruit, JVD, lymphadenopathy, thyromegaly


Respiratory exam: PRESENT: clear to auscultation orlin, symmetrical, unlabored.  

ABSENT: rales, rhonchi, wheezes


Cardiovascular exam: PRESENT: RRR, +S1, +S2.  ABSENT: diastolic murmur, rubs, 

systolic murmur


Pulses: PRESENT: normal dorsalis pedis pul


Vascular exam: PRESENT: normal capillary refill


GI/Abdominal exam: PRESENT: normal bowel sounds, soft.  ABSENT: distended, 

guarding, mass, organolmegaly, rebound, tenderness


Rectal exam: PRESENT: deferred


Extremities exam: PRESENT: joint swelling - Right third MP joint erythema, edema

, and pain.  reduced  strength secondary to pain..  ABSENT: calf tenderness

, clubbing, pedal edema


Neurological exam: PRESENT: alert, awake, oriented to person, oriented to 

situation, CN II-XII grossly intact, aphasic - Significant improvement overnight

, other - 2/5 RUE, 3/5 RLE, 5/5 on left.  Reduced  strength today secondary 

to pain.  ABSENT: oriented to place, oriented to time, motor sensory deficit


Psychiatric exam: PRESENT: appropriate affect, normal mood.  ABSENT: homicidal 

ideation, suicidal ideation


Skin exam: PRESENT: dry, intact, warm.  ABSENT: cyanosis, rash





Results


Laboratory Results: 


 





 06/21/18 04:01 





 06/21/18 04:01 





 











  06/21/18 06/21/18





  04:01 04:01


 


WBC  10.8 H 


 


RBC  5.62 H 


 


Hgb  17.6 H 


 


Hct  49.6 


 


MCV  88 


 


MCH  31.4 


 


MCHC  35.5 


 


RDW  13.0 


 


Plt Count  210 


 


Sodium   135.1 L


 


Potassium   4.0


 


Chloride   102


 


Carbon Dioxide   21 L


 


Anion Gap   12


 


BUN   19


 


Creatinine   0.76


 


Est GFR ( Amer)   > 60


 


Est GFR (Non-Af Amer)   > 60


 


Glucose   287 H


 


Calcium   8.8








 











  06/17/18 06/17/18 06/18/18





  19:55 19:55 01:55


 


Creatine Kinase  89   124


 


CK-MB (CK-2)   2.80 


 


Troponin I   0.348 














  06/18/18 06/18/18 06/18/18





  01:55 07:49 07:49


 


Creatine Kinase   138 


 


CK-MB (CK-2)  2.73   3.10


 


Troponin I  0.563   0.613











Impressions: 


 





Head CTA  06/17/18 00:00


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  Normal 

opacification of the [place].


 








Neck CTA  06/17/18 00:00


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  Up to 50% 

stenosis of the left internal carotid artery and left common carotid artery.  

No significant stenosis of the right carotid or vertebral arteries.


 








Head CT  06/17/18 13:25


IMPRESSION:  Acute or early subacute nonhemorrhagic infarct left posterior 

temporal cortex and subcortical white matter.


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  No 

hydrocephalus.  Intraventricular drainage catheters are unchanged from prior 

studies.


EVIDENCE OF ACUTE STROKE: Yes


 








Chest X-Ray  06/19/18 06:00


IMPRESSION:  NO ACUTE RADIOGRAPHIC FINDING IN THE CHEST.


 














Assessment & Plan





- Diagnosis


(1) Ischemic stroke


Is this a current diagnosis for this admission?: Yes   


Plan: 


Nonhemorrhagic left posterior temporal cortex stroke.


Unable to obtain MRI secondary to ventricular shunts.


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 

with normal [place].


Echocardiogram is benign.


Patient has remained in normal sinus rhythm throughout admission.


Hemoglobin A1c 9.1%


Lipid panel is suboptimal; HDL 41, , triglycerides 178, total 

cholesterol 271.





Continue aspirin, Plavix, and statin therapy.  


Aggressive diabetes management.


Registered dietitian and patient educator are asked to see the patient.


PT/OT/ST therapy are consulted.


At this time, patient is stable for discharge to SNF for acute rehabilitation.








(2) Coronary artery disease


Is this a current diagnosis for this admission?: Yes   


Plan: 


Continue aspirin, Plavix, statin therapy.


Remaining plan as above.








(3) Depression with anxiety


Is this a current diagnosis for this admission?: Yes   


Plan: 


Continue home medications.








(4) Hypertension


Qualifiers: 


   Hypertension type: essential hypertension   Qualified Code(s): I10 - 

Essential (primary) hypertension   


Is this a current diagnosis for this admission?: Yes   


Plan: 


The patient is currently normotensive.











(5) Poorly controlled type 2 diabetes mellitus


Is this a current diagnosis for this admission?: Yes   


Plan: 


Hemoglobin A1c of 9.1%.


Consistent carb diet.


Accu-Cheks before meals and at bedtime with Humalog for sliding scale coverage.


Levemir 32 units nightly.


Registered dietitian and diabetic educator have been consulted.








(6) Chronic pain


Qualifiers: 


   Chronic pain type: chronic pain syndrome   Qualified Code(s): G89.4 - 

Chronic pain syndrome   


Is this a current diagnosis for this admission?: Yes   


Plan: 


We will continue the patient's home dose medications.








(7) Right hand pain


Is this a current diagnosis for this admission?: Yes   


Plan: 


Sudden onset of erythema, edema, and pain to right third MP joint; highly 

suspicious for gout.  However, patient did have a fall the night prior to his 

admission.


We will evaluate with x-ray to rule out occult fracture that may have displaced 

when ambulating using front wheeled walker yesterday.


No history of gout, will check uric acid level.





We will provide colchicine 1.2 mg followed by 0.6 mg in 1 hour.  We will 

continue daily if uric acid level is elevated.


Toradol 15 mg 1.


Oxycodone 5 mg every 4 hours as needed pain.


Limited NSAID use secondary to concurrent aspirin, Plavix, heparin therapy.











- Time


Time Spent with patient: 15-24 minutes


Medications reviewed and adjusted accordingly: Yes


Anticipated discharge: Acute Rehab


Within: when bed available

## 2018-06-21 NOTE — RADIOLOGY REPORT (SQ)
EXAM DESCRIPTION:  HAND RIGHT 2 VIEWS



COMPLETED DATE/TIME:  6/21/2018 5:21 pm



REASON FOR STUDY:  Edema and pain to 3rd MIP; fall 3 days ago



COMPARISON:  None.



EXAM PARAMETERS:  NUMBER OF VIEWS: Three views.

TECHNIQUE: AP, lateral and oblique  radiographic images acquired of the right hand.

LIMITATIONS: Study is limited due to the patient's condition and portable technique.



FINDINGS:  MINERALIZATION: Normal.

BONES: No acute fracture or dislocation.  No worrisome bone lesions.

JOINTS: Mild osteoarthritic changes are identified at several of the interphalangeal joints.

SOFT TISSUES: No soft tissue swelling.  No foreign body.

OTHER: No other significant finding.



IMPRESSION:  Somewhat limited study as noted above.  No definite evidence for acute fracture or dislo
cation.  Other findings as noted above



TECHNICAL DOCUMENTATION:  JOB ID:  [id number]

 2011 [employer]- All Rights Reserved



Reading location - IP/workstation name: [email or web address]

## 2018-06-22 VITALS — DIASTOLIC BLOOD PRESSURE: 78 MMHG | SYSTOLIC BLOOD PRESSURE: 136 MMHG

## 2018-06-22 LAB
ANION GAP SERPL CALC-SCNC: 12 MMOL/L (ref 5–19)
BUN SERPL-MCNC: 14 MG/DL (ref 7–20)
CALCIUM: 9 MG/DL (ref 8.4–10.2)
CHLORIDE SERPL-SCNC: 101 MMOL/L (ref 98–107)
CO2 SERPL-SCNC: 23 MMOL/L (ref 22–30)
ERYTHROCYTE [DISTWIDTH] IN BLOOD BY AUTOMATED COUNT: 13 % (ref 11.5–14)
GLUCOSE SERPL-MCNC: 255 MG/DL (ref 75–110)
HCT VFR BLD CALC: 47.9 % (ref 37.9–51)
HGB BLD-MCNC: 16.9 G/DL (ref 13.5–17)
MCH RBC QN AUTO: 31.1 PG (ref 27–33.4)
MCHC RBC AUTO-ENTMCNC: 35.3 G/DL (ref 32–36)
MCV RBC AUTO: 88 FL (ref 80–97)
PLATELET # BLD: 189 10^3/UL (ref 150–450)
POTASSIUM SERPL-SCNC: 3.9 MMOL/L (ref 3.6–5)
RBC # BLD AUTO: 5.43 10^6/UL (ref 4.35–5.55)
SODIUM SERPL-SCNC: 136.4 MMOL/L (ref 137–145)
WBC # BLD AUTO: 8.5 10^3/UL (ref 4–10.5)

## 2018-06-22 RX ADMIN — TAMSULOSIN HYDROCHLORIDE SCH MG: 0.4 CAPSULE ORAL at 10:41

## 2018-06-22 RX ADMIN — MONTELUKAST SODIUM SCH MG: 10 TABLET, FILM COATED ORAL at 10:38

## 2018-06-22 RX ADMIN — INSULIN LISPRO PRN UNIT: 100 INJECTION, SOLUTION INTRAVENOUS; SUBCUTANEOUS at 07:46

## 2018-06-22 RX ADMIN — GABAPENTIN SCH MG: 100 CAPSULE ORAL at 13:28

## 2018-06-22 RX ADMIN — LANSOPRAZOLE SCH MG: 30 TABLET, ORALLY DISINTEGRATING, DELAYED RELEASE ORAL at 06:01

## 2018-06-22 RX ADMIN — OXYCODONE HYDROCHLORIDE PRN MG: 5 TABLET ORAL at 11:42

## 2018-06-22 RX ADMIN — HEPARIN SODIUM SCH UNIT: 5000 INJECTION, SOLUTION INTRAVENOUS; SUBCUTANEOUS at 13:29

## 2018-06-22 RX ADMIN — Medication SCH ML: at 06:01

## 2018-06-22 RX ADMIN — ESCITALOPRAM OXALATE SCH MG: 10 TABLET, FILM COATED ORAL at 10:39

## 2018-06-22 RX ADMIN — GABAPENTIN SCH MG: 100 CAPSULE ORAL at 10:39

## 2018-06-22 RX ADMIN — INSULIN LISPRO PRN UNIT: 100 INJECTION, SOLUTION INTRAVENOUS; SUBCUTANEOUS at 12:23

## 2018-06-22 RX ADMIN — BUSPIRONE HYDROCHLORIDE SCH MG: 10 TABLET ORAL at 10:38

## 2018-06-22 RX ADMIN — HEPARIN SODIUM SCH UNIT: 5000 INJECTION, SOLUTION INTRAVENOUS; SUBCUTANEOUS at 06:01

## 2018-06-22 RX ADMIN — CLOPIDOGREL BISULFATE SCH MG: 75 TABLET, FILM COATED ORAL at 10:38

## 2018-06-22 RX ADMIN — BUSPIRONE HYDROCHLORIDE SCH MG: 10 TABLET ORAL at 13:28

## 2018-06-22 RX ADMIN — OXYCODONE HYDROCHLORIDE PRN MG: 5 TABLET ORAL at 16:15

## 2018-06-22 RX ADMIN — Medication SCH ML: at 13:30

## 2018-06-22 RX ADMIN — OXYCODONE HYDROCHLORIDE PRN MG: 5 TABLET ORAL at 02:35

## 2018-06-22 RX ADMIN — ASPIRIN SCH MG: 81 TABLET, CHEWABLE ORAL at 10:39

## 2018-06-22 NOTE — PDOC TRANSFER SUMMARY
General





- Admit/Disc Date/PCP


Admission Date/Primary Care Provider: 


  06/17/18 15:20





  JOVI BERG MD





Discharge Date: 06/22/18





- Discharge Diagnosis


(1) Ischemic stroke


Is this a current diagnosis for this admission?: Yes   


Summary: 


Nonhemorrhagic left posterior temporal cortex stroke.


Unable to obtain MRI secondary to ventricular shunts.


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 

with normal Craig of Llamas.


Echocardiogram is benign.


Patient has remained in normal sinus rhythm throughout admission.


Hemoglobin A1c 9.1%


Lipid panel is suboptimal; HDL 41, , triglycerides 178, total 

cholesterol 271.





The patient was admitted to [facility] on continuous cardiac telemetry; he has 

remained in normal sinus rhythm throughout his admission.  He was started on 

aspirin, Plavix, and statin therapy.  


Aggressive diabetes management was initiated.


PT/OT/ST therapy are consulted.  The patient has been participatory with PT/OT; 

however, did develop gout to right hand yesterday which is limiting his ability 

to  walker.  He was placed on colchicine with improved symptoms.


Speech therapy has upgraded the patient to thin liquids and chopped meat diet.





At time of discharge, the patient is in stable condition, maintaining oxygen 

saturations on room air, ambulatory 2-3 steps with front-wheeled walker and 

standby assistance, tolerating a regular diet and is normotensive.


The patient is discharged to SNF for acute rehabilitation.








(2) Coronary artery disease


Is this a current diagnosis for this admission?: Yes   


Summary: 


As above.








(3) Depression with anxiety


Is this a current diagnosis for this admission?: Yes   


Summary: 


The patient's home medications were continued while inpatient; BuSpar 10 mg 

every morning, Lexapro 20 mg daily, and Ativan 0.25 mg 3 times daily as needed 

with adequate control of symptoms.








(4) Hypertension


Is this a current diagnosis for this admission?: Yes   


Summary: 


Likely secondary to acute CVA; patient is currently normotensive and has not 

required antihypertensive medications.








(5) Poorly controlled type 2 diabetes mellitus


Is this a current diagnosis for this admission?: Yes   


Summary: 


Hemoglobin A1c found to be 9.1%.


The patient was placed on long-acting insulin; dose increased per elevated 

fasting glucoses.


He is discharged on Levemir 32 units nightly.


While inpatient, the patient was provided sliding scale insulin.  





At discharge, he is recommended to continue Levemir and resume Farxiga.








(6) Chronic pain


Is this a current diagnosis for this admission?: Yes   





(7) Right hand pain


Is this a current diagnosis for this admission?: Yes   


Summary: 


Improved.  


The patient complained of sudden onset of erythema, edema, and pain to right 

third MP joint; highly suspicious for gout yesterday.  However, patient did 

have a fall the night prior to his admission.


X-ray was negative for acute fracture.


Uric acid is normal.





He was provided colchicine 1.2 mg followed by 0.6 mg in 1 hour and an 

additional 0.6 mg this morning.


Recommend limited NSAID use secondary to concurrent aspirin, Plavix, heparin 

therapy.











- Additional Information


Resuscitation Status: Do Not Resuscitate


Discharge Diet: Cardiac, Diabetic


Discharge Activity: Activity As Tolerated, Slowly Increase Activity, Supervised 

Activity - Physical therapy


Prescriptions: 


Lorazepam [Ativan 0.5 mg Tablet] 0.25 mg PO Q8HP PRN #9 tablet


 PRN Reason: FOR ANXIETY


Oxycodone HCl [Oxy-Ir 5 mg Tablet] 5 mg PO Q6HP PRN #12 tablet


 PRN Reason: For Pain


Zolpidem Tartrate [Ambien] 10 mg PO HSP PRN #7 tablet


 PRN Reason: FOR SLEEP


Home Medications: 








Buspirone HCl [Buspar 10 mg Tablet] 10 mg PO DAILY@1000,1400,1800 06/17/18 


Dapagliflozin Propanediol [Farxiga] 5 mg PO DAILY 06/17/18 


Escitalopram Oxalate [Lexapro] 20 mg PO DAILY 06/17/18 


Fesoterodine Fumarate [Toviaz] 4 mg PO DAILY 06/17/18 


Gabapentin [Neurontin 100 mg Capsule] 200 mg PO DAILY@1000,1400,1800 06/17/18 


Mirabegron [Myrbetriq] 50 mg PO DAILY 06/17/18 


Montelukast Sodium [Singulair 10 mg Tablet] 10 mg PO DAILY 06/17/18 


Omeprazole 40 mg PO QPM 06/17/18 


Tamsulosin HCl [Flomax 0.4 mg Cap.sr] 0.4 mg PO DAILY 06/17/18 


Acetaminophen [Tylenol 325 mg Tablet] 650 mg PO Q4HP PRN  tablet 06/22/18 


Aspirin [Aspirin 81 mg Chewable Tablet] 81 mg PO DAILY  tab.chew 06/22/18 


Atorvastatin Calcium [Lipitor 20 mg Tablet] 40 mg PO QHS  tablet 06/22/18 


Clopidogrel Bisulfate [Plavix 75 mg Tablet] 75 mg PO DAILY  tablet 06/22/18 


Insulin Detemir [Levemir Insulin 100 units/mL] 32 unit SUBCUT QHS  insuln.pen 06 /22/18 


Lorazepam [Ativan 0.5 mg Tablet] 0.25 mg PO Q8HP PRN #9 tablet 06/22/18 


Oxycodone HCl [Oxy-Ir 5 mg Tablet] 5 mg PO Q6HP PRN #12 tablet 06/22/18 


Zolpidem Tartrate [Ambien] 10 mg PO HSP PRN #7 tablet 06/22/18 











History of Present Illness


Admission Date/PCP: 


  06/17/18 15:20





  JOVI BERG MD





History of Present Illness: 


Per H&P by Dr. Concepcion:  JOVI VITALE is a 67 year old man with difficult to 

control diabetes, obesity, hypertension who started falling last night, his 

wife wanted to take him to the ER but he did not want to go, this morning he 

was having slurred speech and she called 911.  The patient also had right arm 

weakness.  In the ER he had signs and symptoms consistent with stroke, onset 

time unknown and he was not a TPA candidate.  Scan of the head showed acute or 

subacute nonhemorrhagic stroke of the left posterior temporal cortex, along 

with a possible colloid cyst which has been seen in the past and also 

intraventricular drainage catheters unchanged from prior studies.  Patient has 

been able to protect his airway.  He has some expressive aphasia.  He is being 

admitted to the hospitalist service, [facility], for continued management.








 





Physical Exam


Vital Signs: 


 











Temp Pulse Resp BP Pulse Ox


 


 97.4 F   81   20   136/78 H  100 


 


 06/22/18 11:43  06/22/18 14:00  06/22/18 11:43  06/22/18 11:43  06/22/18 11:43








 Intake & Output











 06/21/18 06/22/18 06/23/18





 06:59 06:59 06:59


 


Intake Total 1043 1360 1058


 


Output Total 460 1750 1100


 


Balance 583 -390 -42


 


Weight 94.9 kg 92.5 kg 











General appearance: PRESENT: no acute distress, cooperative, obese, well-

developed, well-nourished


Head exam: PRESENT: atraumatic, normocephalic


Eye exam: PRESENT: conjunctiva pink, EOMI, PERRLA.  ABSENT: scleral icterus


Ear exam: PRESENT: normal external ear exam


Mouth exam: PRESENT: moist, tongue midline


Neck exam: ABSENT: carotid bruit, JVD, lymphadenopathy, thyromegaly


Respiratory exam: PRESENT: clear to auscultation orlin, symmetrical, unlabored.  

ABSENT: rales, rhonchi, wheezes


Cardiovascular exam: PRESENT: RRR, +S1, +S2.  ABSENT: diastolic murmur, rubs, 

systolic murmur


Pulses: PRESENT: normal dorsalis pedis pul


Vascular exam: PRESENT: normal capillary refill


GI/Abdominal exam: PRESENT: normal bowel sounds, soft.  ABSENT: distended, 

guarding, mass, organolmegaly, rebound, tenderness


Rectal exam: PRESENT: deferred


Extremities exam: PRESENT: full ROM.  ABSENT: calf tenderness, clubbing, pedal 

edema


Musculoskeletal exam: PRESENT: ambulatory - With front wheel walker and 2 

person assist, tenderness - Erythema and edema to the Rt 3rd MP joint; improved 

from yesterday


Neurological exam: PRESENT: alert, awake, oriented to person, CN II-XII grossly 

intact, aphasic - Continues to improve; speaking clearly with occasional word 

salad.  Able to communicate with multiple attempts., other - 2/5 RUE, 3/5 RLE, 5

/5 on left.  ABSENT: motor sensory deficit


Psychiatric exam: PRESENT: appropriate affect, normal mood.  ABSENT: homicidal 

ideation, suicidal ideation


Skin exam: PRESENT: dry, intact, warm.  ABSENT: cyanosis, rash





Results


Laboratory Results: 


 





 06/22/18 04:30 





 06/22/18 04:30 





 











  06/21/18 06/22/18 06/22/18





  04:01 04:30 04:30


 


WBC   8.5 


 


RBC   5.43 


 


Hgb   16.9 


 


Hct   47.9 


 


MCV   88 


 


MCH   31.1 


 


MCHC   35.3 


 


RDW   13.0 


 


Plt Count   189 


 


Sodium    136.4 L


 


Potassium    3.9


 


Chloride    101


 


Carbon Dioxide    23


 


Anion Gap    12


 


BUN    14


 


Creatinine    0.64


 


Est GFR ( Amer)    > 60


 


Est GFR (Non-Af Amer)    > 60


 


Glucose    255 H


 


Uric Acid  5.9  


 


Calcium    9.0








 











  06/17/18 06/17/18 06/18/18





  19:55 19:55 01:55


 


Creatine Kinase  89   124


 


CK-MB (CK-2)   2.80 


 


Troponin I   0.348 














  06/18/18 06/18/18 06/18/18





  01:55 07:49 07:49


 


Creatine Kinase   138 


 


CK-MB (CK-2)  2.73   3.10


 


Troponin I  0.563   0.613











Impressions: 


 





Head CTA  06/17/18 00:00


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  Normal 

opacification of the Craig of Llamas.


 








Neck CTA  06/17/18 00:00


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  Up to 50% 

stenosis of the left internal carotid artery and left common carotid artery.  

No significant stenosis of the right carotid or vertebral arteries.


 








Head CT  06/17/18 13:25


IMPRESSION:  Acute or early subacute nonhemorrhagic infarct left posterior 

temporal cortex and subcortical white matter.


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  No 

hydrocephalus.  Intraventricular drainage catheters are unchanged from prior 

studies.


EVIDENCE OF ACUTE STROKE: Yes


 








Chest X-Ray  06/19/18 06:00


IMPRESSION:  NO ACUTE RADIOGRAPHIC FINDING IN THE CHEST.


 








Hand X-Ray  06/21/18 00:00


IMPRESSION:  Somewhat limited study as noted above.  No definite evidence for 

acute fracture or dislocation.  Other findings as noted above


 














Transfer Plan





- Disposition


Transfer Plan: 





Dicharge to SNF for short term rehabilitation.





- Time Spent with Patient


Time spent with patient: Less than 30 Minutes





Qualifiers





- *


PATIENT BEING DISCHARGED WITH ANY OF THE FOLLOWING DIAGNOSIS: Stroke


Stroke Pt being discharged on Anti-thrombolytic therapy?: Yes


Stroke Pt being discharged on Anti-coagulation therapy?: No


Reason(s) for not prescribing Anti-coagulation therapy:: Not indicated


Stroke Pt being discharged on Statins?: Yes





Plan


Discharge Plan: 





Discharge to [facility] for short-term rehab.


Follow-up with the [facility] vascular surgery in 6-8 weeks.


Time Spent: Less than 30 Minutes

## 2018-09-26 ENCOUNTER — HOSPITAL ENCOUNTER (EMERGENCY)
Dept: HOSPITAL 62 - ER | Age: 68
Discharge: HOME | End: 2018-09-26
Payer: MEDICARE

## 2018-09-26 VITALS — DIASTOLIC BLOOD PRESSURE: 89 MMHG | SYSTOLIC BLOOD PRESSURE: 136 MMHG

## 2018-09-26 DIAGNOSIS — E78.00: ICD-10-CM

## 2018-09-26 DIAGNOSIS — E11.9: ICD-10-CM

## 2018-09-26 DIAGNOSIS — I69.951: ICD-10-CM

## 2018-09-26 DIAGNOSIS — I10: ICD-10-CM

## 2018-09-26 DIAGNOSIS — R51: Primary | ICD-10-CM

## 2018-09-26 PROCEDURE — 93010 ELECTROCARDIOGRAM REPORT: CPT

## 2018-09-26 PROCEDURE — 70450 CT HEAD/BRAIN W/O DYE: CPT

## 2018-09-26 PROCEDURE — 99284 EMERGENCY DEPT VISIT MOD MDM: CPT

## 2018-09-26 PROCEDURE — 93005 ELECTROCARDIOGRAM TRACING: CPT

## 2018-09-26 NOTE — EKG REPORT
SEVERITY:- ABNORMAL ECG -

SINUS RHYTHM

LVH WITH SECONDARY REPOLARIZATION ABNORMALITY

:

Confirmed by: Alannah Lindo MD 26-Sep-2018 07:55:22

## 2018-09-26 NOTE — ER DOCUMENT REPORT
HPI





- HPI


Pain Level: 3


Notes: 





Patient is a 68-year-old male with a history of type 2 diabetes, hypertension, 

coronary artery disease, CVA 3 months ago who presents to the ED with wife 

complaining of a gradual onset of a headache that began around 1030 last night.

  Patient states that his headache has almost completely resolved at this time 

and he is feeling much better.  Wife states that he did not have any stroke-

like symptoms that he had in the past.  He had been eating and drinking without 

any difficulties.  He is urinating normally and having normal bowel movements.  

Wife states that he did take aspirin prior to arrival.  Denies any drug 

allergies.  Denies any injury.  He has residual aphasia from the prev CVA that 

has remained unchanged as well as R>L side weakness, but improved since initial 

stroke.  Denies any fever, head injury, neck pain/stiffness, falls, changes in 

vision/speech/mentation/hearing, URI, sore throat, chest pain, palpitations, 

syncope, cough, shortness of breath, wheeze, dyspnea, abdominal pain, nausea/

vomiting/diarrhea, urinary retention, dysuria, hematuria, loss of control of 

bowel or bladder, numbness/tingling, saddle anesthesia, muscle paralysis, or 

rash.  





- ROS


Systems Reviewed and Negative: Yes All other systems reviewed and negative





Past Medical History





- Social History


Smoking Status: Unknown if Ever Smoked


Family History: Reviewed & Not Pertinent





- Past Medical History


Cardiac Medical History: Reports: Hx Coronary Artery Disease, Hx 

Hypercholesterolemia, Hx Hypertension, Hx Peripheral Vascular Disease


Neurological Medical History: Reports: Hx Cerebrovascular Accident - 

Approximately 1 year ago.  Denies: Hx Seizures


Endocrine Medical History: Reports: Hx Diabetes Mellitus Type 1, Hx Diabetes 

Mellitus Type 2


Renal/ Medical History: Denies: Hx End Stage Renal Disease, Hx Peritoneal 

Dialysis


GI Medical History: Denies: Hx Cirrhosis


Musculoskeletal Medical History: Denies Hx Fibromyalgia, Denies Hx Gout


Skin Medical History: Denies Hx Eczema, Denies Hx Psoriasis


Psychiatric Medical History: Reports: Hx Depression


   Denies: Hx Dementia


Past Surgical History: Reports: Hx Cardiac Catheterization, Hx Coronary Stent, 

Hx Neurologic Surgery - shunts of brain for hydrocephalus., Hx Orthopedic 

Surgery - bilateral rotator cuff repairs





Vertical Provider Document





- CONSTITUTIONAL


Agree With Documented VS: Yes


Notes: 





PHYSICAL EXAMINATION:  





GENERAL: Well-appearing, well-nourished and in no acute distress.  A&Ox4.  

Answers questions appropriately.  Expressive aphasia noted, but still 

understandable.





HEAD: Atraumatic, normocephalic.  Non-tender. 





EYES: Pupils equal round and reactive to light, extraocular movements intact, 

sclera anicteric, conjunctiva are normal.  No nystagmus.  vis fields intact.





ENT: Nares patent and without discharge.  oropharynx clear without exudates.  

No tonsilar hypertrophy or erythema.  Moist mucous membranes.  No sinus 

tenderness. 





NECK: Normal range of motion, supple without lymphadenopathy.  No rigidity/

meningismus.  No midline tenderness. 





LUNGS: Breath sounds clear to auscultation bilaterally and equal.  No wheezes 

rales or rhonchi.





HEART: Regular rate and rhythm without murmurs, rubs, gallops.





ABDOMEN: Soft, nontender, nondistended abdomen.  No guarding, no rebound.  

Normal bowel sounds present.  No CVA tenderness bilaterally.  





Musculoskeletal: Ext's b/l:  FROM to passive/active. Strength 5+/5.  No 

deficits noted.  No bony tenderness of extremities.





Extremities:  No cyanosis, clubbing, or edema b/l.  Peripheral pulses 2+.  

Capillary refill less than 2 seconds.





NEUROLOGICAL: GCS 15.  Cranial nerves grossly intact.  expressive aphasia (

stable per pt/family).  Normal sensory, motor exams.  Reflexes 2+ b/l. MAYA's 

negative.  Pronator drift negative. Heel/shin, finger/nose wnl. 





PSYCH: Normal mood, normal affect.





SKIN: Warm, Dry, normal turgor, no rashes or lesions noted.





- INFECTION CONTROL


TRAVEL OUTSIDE OF THE [place] IN LAST 30 DAYS: No





Course





- Re-evaluation


Re-evalutation: 





09/26/18 02:54


Patient is an afebrile, well-hydrated, 68-year-old male who presents to the ED 

with a frontal headache, since resolved.  Vitals are acceptable without any 

significant tachycardia, tachypnea, or hypoxia.  PE is otherwise unremarkable 

for any focal neurological deficits aside from known expressive aphasia.  CT 

scan of the head was unremarkable for any acute pathology.  GCS 15, cranial 

nerves grossly intact brain no further labs or imaging warranted at this time 

based on H&P.  Patient was given Tylenol in the ED and he did take aspirin 

prior to arrival.  Patient states that he is feeling well and would like to go 

home.  He is nontoxic-appearing and is tolerating p.o. without any 

difficulties.  Low suspicion for any acute glaucoma, temporal arteritis, 

meningitis, intracranial hemorrhage, ischemic stroke, or fracture at this time.

  Patient is aware that his condition can change from initial presentation and 

that he needs to monitor symptoms closely for any acute changes.  Recheck with 

your PCM in 1-2 days.  Return to the ED with any worsening/concerning symptoms 

otherwise as reviewed discharge.  Patient and wife are in agreement.





- Vital Signs


Vital signs: 


 











Temp Pulse Resp BP Pulse Ox


 


 97.3 F   91   18   136/86 H  96 


 


 09/26/18 00:37  09/26/18 00:37  09/26/18 00:37  09/26/18 00:37  09/26/18 00:37














Discharge





- Discharge


Clinical Impression: 


Headache


Qualifiers:


 Headache type: unspecified Headache chronicity pattern: acute headache 

Intractability: not intractable Qualified Code(s): R51 - Headache





Condition: Stable


Disposition: HOME, SELF-CARE


Instructions:  Headache ([facility])


Additional Instructions: 


Rest, Ice


Tylenol/ibuprofen as needed


Light stretches daily


Strength exercises as able


Moist heat and massage may help


F/u with your PCP in 3-5 days for a recheck


Consider consult with neurology





Return to the ED with any worsening symptoms and/or development of fever, 

headache, changes in behavior/mentation/vision/speech, chest pain, palpitations

, syncope, shortness of breath, trouble breathing, abdominal pain, n/v/d, blood 

in stool/urine, loss of control of bowel/bladder, urinary retention, muscle 

weakness/paralysis, saddle anesthesia, numbness/tingling, or other worsening 

symptoms that are concerning to you.


Forms:  Elevated Blood Pressure


Referrals: 


JOVI BERG MD [Primary Care Provider] - Follow up tomorrow

## 2018-09-26 NOTE — RADIOLOGY REPORT (SQ)
EXAM DESCRIPTION: 



CT HEAD WITHOUT IV CONTRAST



COMPLETED DATE/TME:  09/26/2018 01:11



CLINICAL HISTORY: 



68 years, Male, headache



COMPARISON:

6/17/2018



TECHNIQUE:

Axial CT images of the brain were obtained without contrast.

Sagittal and coronal reformats were performed. [facility] 1123  Images

stored on PACS.

 

All CT scanners at this facility use dose modulation, iterative

reconstruction, and/or weight based dosing when appropriate to

reduce radiation dose to as low as reasonably achievable (ALARA).





[facility]: Dose Right [facility]: CareDose   [facility]: Dose Right    [facility]:

Teradose 4D    [facility]: [employer] Technologies



LIMITATIONS:

None.



FINDINGS:



Again noted is a hyperdense mass measuring 2.5 x 2.2 cm centered

within the third ventricle. There is no hemorrhage, midline

shift, herniation, hydrocephalus, or extra-axial fluid

collection. The bilateral posterior approach ventricular shunt

catheters are stable in position with grossly stable ventricular

size.

There is a chronic posterior left MCA distribution infarct. There

is diffuse cerebral atrophy with microvascular changes along the

left frontal lobe and corona radiata.

The paranasal sinuses and mastoid air cells are clear. There is

no acute fracture.





IMPRESSION:



No acute intracranial abnormality. Grossly stable colloid cyst

within the third ventricle. Stable position of the two posterior

approach ventricular shunt catheters with grossly stable

ventricular size.





TECHNICAL DOCUMENTATION:



Quality ID # [id number]: Final reports with documentation of one or more

dose reduction techniques (e.g., Automated exposure control,

adjustment of the mA and/or kV according to patient size, use of

iterative reconstruction technique)



 2011 [employer]- All Rights Reserved

## 2019-10-04 ENCOUNTER — HOSPITAL ENCOUNTER (OUTPATIENT)
Dept: HOSPITAL 62 - ER | Age: 69
Setting detail: OBSERVATION
LOS: 1 days | Discharge: HOME | End: 2019-10-05
Attending: FAMILY MEDICINE | Admitting: FAMILY MEDICINE
Payer: MEDICARE

## 2019-10-04 DIAGNOSIS — Z86.73: ICD-10-CM

## 2019-10-04 DIAGNOSIS — I10: ICD-10-CM

## 2019-10-04 DIAGNOSIS — Z82.49: ICD-10-CM

## 2019-10-04 DIAGNOSIS — Z73.3: ICD-10-CM

## 2019-10-04 DIAGNOSIS — Z79.899: ICD-10-CM

## 2019-10-04 DIAGNOSIS — E66.01: ICD-10-CM

## 2019-10-04 DIAGNOSIS — Z87.891: ICD-10-CM

## 2019-10-04 DIAGNOSIS — K21.9: ICD-10-CM

## 2019-10-04 DIAGNOSIS — Z79.4: ICD-10-CM

## 2019-10-04 DIAGNOSIS — R41.89: ICD-10-CM

## 2019-10-04 DIAGNOSIS — E78.5: ICD-10-CM

## 2019-10-04 DIAGNOSIS — Z95.1: ICD-10-CM

## 2019-10-04 DIAGNOSIS — F41.8: ICD-10-CM

## 2019-10-04 DIAGNOSIS — Z98.2: ICD-10-CM

## 2019-10-04 DIAGNOSIS — E11.51: ICD-10-CM

## 2019-10-04 DIAGNOSIS — Z79.82: ICD-10-CM

## 2019-10-04 DIAGNOSIS — I25.110: Primary | ICD-10-CM

## 2019-10-04 LAB
ADD MANUAL DIFF: NO
ALBUMIN SERPL-MCNC: 4.3 G/DL (ref 3.5–5)
ALP SERPL-CCNC: 75 U/L (ref 38–126)
ANION GAP SERPL CALC-SCNC: 12 MMOL/L (ref 5–19)
AST SERPL-CCNC: 32 U/L (ref 17–59)
BASE EXCESS BLDV CALC-SCNC: -4.4 MMOL/L
BASOPHILS NFR BLD AUTO: 0.7 % (ref 0–2)
BILIRUB DIRECT SERPL-MCNC: 0.2 MG/DL (ref 0–0.4)
BILIRUB SERPL-MCNC: 0.6 MG/DL (ref 0.2–1.3)
BUN SERPL-MCNC: 21 MG/DL (ref 7–20)
CALCIUM: 9.9 MG/DL (ref 8.4–10.2)
CHLORIDE SERPL-SCNC: 101 MMOL/L (ref 98–107)
CK MB SERPL-MCNC: 3.14 NG/ML (ref ?–4.55)
CK MB SERPL-MCNC: 3.68 NG/ML (ref ?–4.55)
CK MB SERPL-MCNC: 3.82 NG/ML (ref ?–4.55)
CO2 SERPL-SCNC: 25 MMOL/L (ref 22–30)
EOSINOPHIL NFR BLD AUTO: 3 % (ref 0–6)
ERYTHROCYTE [DISTWIDTH] IN BLOOD BY AUTOMATED COUNT: 13.9 % (ref 11.5–14)
GLUCOSE SERPL-MCNC: 184 MG/DL (ref 75–110)
HCO3 BLDV-SCNC: 20.1 MMOL/L (ref 20–32)
HCT VFR BLD CALC: 52.7 % (ref 37.9–51)
HGB BLD-MCNC: 17.6 G/DL (ref 13.5–17)
LYMPHOCYTES NFR BLD AUTO: 19 % (ref 13–45)
MCH RBC QN AUTO: 29.7 PG (ref 27–33.4)
MCHC RBC AUTO-ENTMCNC: 33.3 G/DL (ref 32–36)
MCV RBC AUTO: 89 FL (ref 80–97)
MONOCYTES NFR BLD AUTO: 8.2 % (ref 3–13)
NEUTS SEG NFR BLD AUTO: 69.1 % (ref 42–78)
PCO2 BLDV: 36.1 MMHG (ref 35–63)
PH BLDV: 7.36 [PH] (ref 7.3–7.42)
PLATELET # BLD: 216 10^3/UL (ref 150–450)
PLATELET COMMENT: ADEQUATE
POTASSIUM SERPL-SCNC: 4.3 MMOL/L (ref 3.6–5)
PROT SERPL-MCNC: 7.1 G/DL (ref 6.3–8.2)
RBC # BLD AUTO: 5.93 10^6/UL (ref 4.35–5.55)
TOTAL CELLS COUNTED % (AUTO): 100 %
TROPONIN I SERPL-MCNC: < 0.012 NG/ML
WBC # BLD AUTO: 9 10^3/UL (ref 4–10.5)

## 2019-10-04 PROCEDURE — 85025 COMPLETE CBC W/AUTO DIFF WBC: CPT

## 2019-10-04 PROCEDURE — 93010 ELECTROCARDIOGRAM REPORT: CPT

## 2019-10-04 PROCEDURE — 82962 GLUCOSE BLOOD TEST: CPT

## 2019-10-04 PROCEDURE — G0378 HOSPITAL OBSERVATION PER HR: HCPCS

## 2019-10-04 PROCEDURE — 99285 EMERGENCY DEPT VISIT HI MDM: CPT

## 2019-10-04 PROCEDURE — 80053 COMPREHEN METABOLIC PANEL: CPT

## 2019-10-04 PROCEDURE — 82550 ASSAY OF CK (CPK): CPT

## 2019-10-04 PROCEDURE — 82803 BLOOD GASES ANY COMBINATION: CPT

## 2019-10-04 PROCEDURE — 82553 CREATINE MB FRACTION: CPT

## 2019-10-04 PROCEDURE — 84484 ASSAY OF TROPONIN QUANT: CPT

## 2019-10-04 PROCEDURE — 93005 ELECTROCARDIOGRAM TRACING: CPT

## 2019-10-04 PROCEDURE — 71046 X-RAY EXAM CHEST 2 VIEWS: CPT

## 2019-10-04 PROCEDURE — 36415 COLL VENOUS BLD VENIPUNCTURE: CPT

## 2019-10-04 RX ADMIN — Medication SCH ML: at 06:06

## 2019-10-04 RX ADMIN — GABAPENTIN SCH MG: 100 CAPSULE ORAL at 18:10

## 2019-10-04 RX ADMIN — INSULIN HUMAN SCH: 100 INJECTION, SOLUTION PARENTERAL at 21:44

## 2019-10-04 RX ADMIN — HEPARIN SODIUM SCH UNIT: 5000 INJECTION, SOLUTION INTRAVENOUS; SUBCUTANEOUS at 13:18

## 2019-10-04 RX ADMIN — Medication SCH ML: at 21:45

## 2019-10-04 RX ADMIN — HEPARIN SODIUM SCH UNIT: 5000 INJECTION, SOLUTION INTRAVENOUS; SUBCUTANEOUS at 21:44

## 2019-10-04 RX ADMIN — INSULIN HUMAN SCH UNIT: 100 INJECTION, SOLUTION PARENTERAL at 11:46

## 2019-10-04 RX ADMIN — Medication SCH ML: at 13:18

## 2019-10-04 RX ADMIN — INSULIN HUMAN SCH: 100 INJECTION, SOLUTION PARENTERAL at 18:08

## 2019-10-04 RX ADMIN — INSULIN HUMAN SCH: 100 INJECTION, SOLUTION PARENTERAL at 08:34

## 2019-10-04 RX ADMIN — FAMOTIDINE SCH MG: 20 TABLET, FILM COATED ORAL at 21:44

## 2019-10-04 RX ADMIN — FAMOTIDINE SCH MG: 20 TABLET, FILM COATED ORAL at 09:35

## 2019-10-04 RX ADMIN — HEPARIN SODIUM SCH UNIT: 5000 INJECTION, SOLUTION INTRAVENOUS; SUBCUTANEOUS at 06:06

## 2019-10-04 RX ADMIN — DOCUSATE SODIUM SCH: 100 CAPSULE, LIQUID FILLED ORAL at 18:09

## 2019-10-04 RX ADMIN — METOPROLOL TARTRATE SCH MG: 50 TABLET, FILM COATED ORAL at 21:44

## 2019-10-04 RX ADMIN — DOCUSATE SODIUM SCH MG: 100 CAPSULE, LIQUID FILLED ORAL at 09:35

## 2019-10-04 NOTE — PDOC PROGRESS REPORT
Subjective


Progress Note for:: 10/04/19


Subjective:: 





The patient and his wife wish to speak to the doctor.  He has been having a 

recurrent episode of the central chest discomfort.


Reason For Visit: 


CHEST PAIN








Physical Exam


Vital Signs: 


                                        











Temp Pulse Resp BP Pulse Ox


 


 97.8 F   99   20   133/75 H  97 


 


 10/04/19 19:33  10/04/19 19:33  10/04/19 19:33  10/04/19 19:33  10/04/19 19:33








                                 Intake & Output











 10/03/19 10/04/19 10/05/19





 06:59 06:59 06:59


 


Intake Total   1116


 


Balance   1116


 


Weight  95.5 kg 90 kg











General appearance: PRESENT: cooperative, mild distress, well-developed


Head exam: PRESENT: atraumatic, normocephalic


Mouth exam: PRESENT: dry mucosa, tongue midline


Respiratory exam: PRESENT: clear to auscultation orlin, symmetrical.  ABSENT: 

rales, rhonchi, wheezes


Cardiovascular exam: PRESENT: RRR, +S1, +S2


GI/Abdominal exam: PRESENT: normal bowel sounds, soft.  ABSENT: distended, 

tenderness


Extremities exam: ABSENT: pedal edema


Neurological exam: PRESENT: alert, awake, oriented to person, oriented to place,

oriented to time, oriented to situation, other - Decreased dexterity right hand 

and possible cognitive impairment from recent stroke





Results


Laboratory Results: 


                                        





                                 10/04/19 01:58 





                                 10/04/19 01:58 





                                        











  10/04/19 10/04/19 10/04/19





  01:58 01:58 01:58


 


WBC  9.0  


 


RBC  5.93 H  


 


Hgb  17.6 H  


 


Hct  52.7 H  


 


MCV  89  


 


MCH  29.7  


 


MCHC  33.3  


 


RDW  13.9  


 


Plt Count  216  


 


Seg Neutrophils %  69.1  


 


VBG pH    7.36


 


VBG pCO2    36.1


 


VBG HCO3    20.1


 


VBG Base Excess    -4.4


 


Sodium   138.3 


 


Potassium   4.3 


 


Chloride   101 


 


Carbon Dioxide   25 


 


Anion Gap   12 


 


BUN   21 H 


 


Creatinine   0.95 


 


Est GFR ( Amer)   > 60 


 


Glucose   184 H 


 


Calcium   9.9 


 


Total Bilirubin   0.6 


 


AST   32 


 


Alkaline Phosphatase   75 


 


Total Protein   7.1 


 


Albumin   4.3 








                                        











  10/04/19 10/04/19 10/04/19





  01:58 08:27 08:27


 


Creatine Kinase   78 


 


CK-MB (CK-2)    3.82


 


Troponin I  < 0.012   < 0.012














  10/04/19 10/04/19 10/04/19





  14:33 14:33 19:55


 


Creatine Kinase  76   74


 


CK-MB (CK-2)   3.68 


 


Troponin I   < 0.012 














  10/04/19





  19:55


 


Creatine Kinase 


 


CK-MB (CK-2)  3.14


 


Troponin I  < 0.012











Impressions: 


                                        





Chest X-Ray  10/04/19 01:22


IMPRESSION:


 


No acute cardiopulmonary process


 


 


copyright 2011 [employer]- All Rights Reserved


 














Assessment and Plan





- Diagnosis


(1) Angina at rest


Is this a current diagnosis for this admission?: Yes   





(2) Diabetes mellitus type 2 in obese


Is this a current diagnosis for this admission?: Yes   





(3) Coronary artery disease


Qualifiers: 


   Coronary Disease-Associated Artery/Lesion type: native artery   Native vs. 

transplanted heart: native heart   Associated angina: with unstable angina   

Qualified Code(s): I25.110 - Atherosclerotic heart disease of native coronary 

artery with unstable angina pectoris   


Is this a current diagnosis for this admission?: Yes   





(4) Depression with anxiety


Is this a current diagnosis for this admission?: Yes   





- Plan Summary


Summary: 


Patient is admitted observation status will have serial cardiac enzymes perfor

med.  A cardiac stress test will be obtained using a Cardiolite protocol.  The 

patient will be observed on telemetry bed and will be reevaluated based upon the

results of his cardiac stress test and cardiac enzyme testing.  He will use 

morphine sulfate 2 to 4 mg IV every 2 hours on a as needed basis via sliding 

scale for pain.  He will be continued on a cardiac diet with a diabetic carb for

plan.  He will be continued on his usual medications for heart disease 

hypertension and diabetes as well as his other usual medications.  A hemoglobin 

A1c will be obtained.  Patient will be placed on a sliding scale for 

hyperglycemia and a hypoglycemic protocol will be in place for use with before 

meals and at bedtime Accu-Cheks.





10/4/2019-


I reviewed Dr. Weiland's admission history and physical.  I reviewed the 

patient's troponins and these are negative so far.  Despite his history of 

coronary disease I had a long discussion with the patient and his wife.  He has 

been under a lot of stress.  He also has a history of reflux.  He has not been 

sleeping well.  His wife also reports that he suffers from significant anxiety. 

The cardiac surgery and subsequent stroke have had a devastating effect on the 

patient.  We reviewed all the reasons why his current condition would support a 

cardiac diagnosis versus a depression/anxiety diagnosis.  We revealed all of the

contributing factors and the patient's overall clinical status lately.  His wife

states that he is very inactive.  He reports that he is very disillusioned with 

his current condition.  His wife reports that is very short tempered.  I 

reviewed how all of this was more likely anxiety and depression and this 

certainly was contributing to his chest discomfort.  His wife reports that at 

home when he had been having similar symptoms she provided a dose of 

benzodiazepine and the symptoms went away and the patient was able to relax and 

go to sleep.  Because of his cardiac history I will resume isosorbide (this is 

actually a new prescription for the patient just started 2 days ago) as well as 

his other cardiac medications.  We are going to be in arranging for stress test 

as an outpatient.  I told the patient that I strongly believe his cardiac 

enzymes will be negative.  The patient's wife informed me that Dr. Ruben Treviño 

has already started to arrange outpatient stress test.  The patient has a 

follow-up with Dr. Ruben Treviño as well as Dr. Rhoades post discharge.  He is 

also following with Dr. Lee his psychiatrist and I strongly encouraged the 

patient and his wife to discuss anxiety and depression.


Interestingly enough, the patient is listed as being on Lexapro.  The patient 

and his wife or not aware that he had been prescribed an antidepressant.  His 

pills are bubble packed so it is difficult to keep tractable the different 

pills.


Towards the end of our discussion the patient in fact said that he was having 

substernal chest discomfort.  Nitroglycerin is available.  He did not take his 

him door this morning and so I ordered a dose for now.  I also ordered a single 

dose of benzodiazepine therapy as I have a strong feeling that this will relieve

his discomfort.





- Time


Total Critical Time (Minutes): 35


Medications reviewed and adjusted accordingly: Yes


Anticipated discharge: Home


Within: within 24 hours

## 2019-10-04 NOTE — PDOC H&P
History of Present Illness


Admission Date/PCP: 


  





  JOVI BERG MD





Patient complains of: Chest pain


History of Present Illness: 


JOVI VITALE is a 69 year old male who presented to the emergency room with 

a 2-day history of chest pain.  Patient admits that he has been suffering from 

intermittent chest pain episodes for the last 2 days.  His pain is described as 

being a vague pressure-like discomfort in the center of his chest without 

radiation.  Episodes of pain last for a couple of minutes and resolve 

spontaneously.  The pain occurs both at times of exertion and times of rest with

his last episode waking him from sleep prior to coming to the emergency room.  

Pain is accompanied by dyspnea which worsens with exertion, but he denies any 

other accompanying or associated signs or symptoms.  He was seen by his primary 

care provider yesterday and a plan to have him undergo a cardiac stress test was

initiated for next week.  He admits prior similar episodes related to his 

coronary artery disease.  He has not identified any aggravating or ameliorating 

factors for his chest pain.  In the emergency room patient was asymptomatic and 

his initial evaluation showed no evidence of acute myocardial ischemia or injury

on his EKG or initial cardiac enzymes.  Because of the patient's history of 

coronary artery disease and prior angioplasty with stents he was admitted to 

observation for further evaluation treatment.





Past Medical History


Cardiac Medical History: Reports: Coronary Artery Disease, Hyperlipidema, 

Hypertension, Peripheral Vascular Disease


Pulmonary Medical History: 


   Denies: Asthma, Chronic Obstructive Pulmonary Disease (COPD)


EENT Medical History: 


   Denies: Cataracts, Ears - Hearing aids


Neurological Medical History: Reports: Ischemic CVA


   Denies: Hemorrhagic CVA, Seizures


Endocrine Medical History: Reports: Diabetes Mellitus Type 2


   Denies: Diabetes Mellitus Type 1, Hyperthyroidism, Hypothyroidism


Renal/ Medical History: 


   Denies: Chronic Kidney Disease, End Stage Renal Disease, Nephrolithiasis


Malignancy Medical History: Reports: None


GI Medical History: 


   Denies: Cirrhosis, Hepatitis


Musculoskeltal Medical History: 


   Denies: Arthritis, Gout


Skin Medical History: 


   Denies: Eczema, Psoriasis


Psychiatric Medical History: Reports: Depression


   Denies: Alcohol Dependency, Dementia, Substance Abuse, Tobacco Dependency


Traumatic Medical History: Reports: None


Hematology: 


   Denies: Anemia, Bleeding Tendencies


Infectious Medical History: Reports: None





Past Surgical History


Past Surgical History: Reports: Cardiac Catheterization, Coronary Stent, 

Orthopedic Surgery - bilateral rotator cuff repairs





Social History


Information Source: Patient


Lives with: Spouse/Significant other


Smoking Status: Former Smoker


Electronic Cigarette use?: No


Frequency of Alcohol Use: None


Hx Recreational Drug Use: No


Drugs: None


Hx Prescription Drug Abuse: No





- Advance Directive


Resuscitation Status: Full Code


Surrogate healthcare decision maker:: 


Conchita Vitale





Family History


Family History: CAD, Malignancy


Parental Family History Reviewed: Yes


Children Family History Reviewed: No


Sibling(s) Family History Reviewed.: Yes





Medication/Allergy


Home Medications: 








Buspirone HCl [Buspar 10 mg Tablet] 10 mg PO DAILY@1000,1400,1800 06/17/18 


Dapagliflozin Propanediol [Farxiga] 5 mg PO DAILY 06/17/18 


Escitalopram Oxalate [Lexapro] 20 mg PO DAILY 06/17/18 


Fesoterodine Fumarate [Toviaz] 4 mg PO DAILY 06/17/18 


Gabapentin [Neurontin 100 mg Capsule] 200 mg PO DAILY@1000,1400,1800 06/17/18 


Mirabegron [Myrbetriq] 50 mg PO DAILY 06/17/18 


Montelukast Sodium [Singulair 10 mg Tablet] 10 mg PO DAILY 06/17/18 


Omeprazole 40 mg PO QPM 06/17/18 


Tamsulosin HCl [Flomax 0.4 mg Cap.sr] 0.4 mg PO DAILY 06/17/18 


Acetaminophen [Tylenol 325 mg Tablet] 650 mg PO Q4HP PRN  tablet 06/22/18 


Aspirin [Aspirin 81 mg Chewable Tablet] 81 mg PO DAILY  tab.chew 06/22/18 


Atorvastatin Calcium [Lipitor 20 mg Tablet] 40 mg PO QHS  tablet 06/22/18 


Clopidogrel Bisulfate [Plavix 75 mg Tablet] 75 mg PO DAILY  tablet 06/22/18 


Insulin Detemir [Levemir Insulin 100 units/mL Insulin Pen] 32 unit SUBCUT QHS  

insuln.pen 06/22/18 


Lorazepam [Ativan 0.5 mg Tablet] 0.25 mg PO Q8HP PRN #9 tablet 06/22/18 


Oxycodone HCl [Oxy-Ir 5 mg Tablet] 5 mg PO Q6HP PRN #12 tablet 06/22/18 


Zolpidem Tartrate [Ambien] 10 mg PO HSP PRN #7 tablet 06/22/18 








Allergies/Adverse Reactions: 


                                        





No Known Allergies Allergy (Verified 12/13/17 16:21)


   











Review of Systems


Constitutional: ABSENT: chills, fever(s)


Eyes: ABSENT: visual disturbances, other - Eye pain


Ears: ABSENT: hearing changes, other - Ear pain


Nose, Mouth, and Throat: ABSENT: mouth pain, sore throat


Cardiovascular: PRESENT: as per HPI, chest pain, dyspnea on exertion.  ABSENT: 

edema, orthropnea, palpitations


Respiratory: PRESENT: dyspnea.  ABSENT: cough


Gastrointestinal: ABSENT: abdominal pain, constipation, diarrhea, nausea, 

vomiting


Genitourinary: ABSENT: dysuria, hematuria


Musculoskeletal: ABSENT: back pain, joint swelling, muscle weakness


Integumentary: ABSENT: pruritus, rash


Neurological: ABSENT: confusion, convulsions, focal weakness, memory loss, 

syncope


Psychiatric: ABSENT: anxiety, depression


Endocrine: ABSENT: cold intolerance, heat intolerance


Hematologic/Lymphatic: ABSENT: easy bleeding, easy bruising


Allergic/Immunologic: ABSENT: seasonal rhinorrhea





Physical Exam


Vital Signs: 


                                        











Temp Pulse Resp BP Pulse Ox


 


 97.3 F   92   20   170/84 H  100 


 


 10/04/19 00:42  10/04/19 00:42  10/04/19 00:42  10/04/19 00:42  10/04/19 00:42








                                 Intake & Output











 10/02/19 10/03/19 10/04/19





 23:59 23:59 23:59


 


Weight   95.5 kg











General appearance: PRESENT: no acute distress, cooperative


Head exam: PRESENT: atraumatic, normocephalic


Eye exam: PRESENT: conjunctiva pink.  ABSENT: conjunctival injection, scleral 

icterus


Ear exam: PRESENT: normal external ear exam.  ABSENT: bleeding, drainage


Mouth exam: PRESENT: dry mucosa, neck supple


Neck exam: ABSENT: thyromegaly, tracheal deviation


Respiratory exam: PRESENT: clear to auscultation orlin, symmetrical, unlabored


Cardiovascular exam: PRESENT: RRR.  ABSENT: clicks, gallop, rubs


Pulses: PRESENT: normal radial pulses, normal dorsalis pedis pul


Vascular exam: PRESENT: normal capillary refill.  ABSENT: pallor


GI/Abdominal exam: PRESENT: normal bowel sounds, soft


Rectal exam: PRESENT: deferred


Extremities exam: ABSENT: joint swelling, pedal edema


Musculoskeletal exam: ABSENT: deformity, dislocation


Neurological exam: PRESENT: alert, oriented to person, oriented to place, 

oriented to time, oriented to situation, CN II-XII grossly intact.  ABSENT: 

motor sensory deficit


Psychiatric exam: PRESENT: appropriate affect, normal mood


Skin exam: PRESENT: dry, intact, warm.  ABSENT: jaundice, rash, urticaria





Results


Laboratory Results: 


                                        





                                 10/04/19 01:58 





                                 10/04/19 01:58 





                                        











  10/04/19 10/04/19 10/04/19





  01:58 01:58 01:58


 


WBC  9.0  


 


RBC  5.93 H  


 


Hgb  17.6 H  


 


Hct  52.7 H  


 


MCV  89  


 


MCH  29.7  


 


MCHC  33.3  


 


RDW  13.9  


 


Plt Count  216  


 


Seg Neutrophils %  69.1  


 


VBG pH    7.36


 


VBG pCO2    36.1


 


VBG HCO3    20.1


 


VBG Base Excess    -4.4


 


Sodium   138.3 


 


Potassium   4.3 


 


Chloride   101 


 


Carbon Dioxide   25 


 


Anion Gap   12 


 


BUN   21 H 


 


Creatinine   0.95 


 


Est GFR ( Amer)   > 60 


 


Glucose   184 H 


 


Calcium   9.9 


 


Total Bilirubin   0.6 


 


AST   32 


 


Alkaline Phosphatase   75 


 


Total Protein   7.1 


 


Albumin   4.3 








                                        











  10/04/19





  01:58


 


Troponin I  < 0.012











Impressions: 


                                        





Chest X-Ray  10/04/19 01:22


IMPRESSION:


 


No acute cardiopulmonary process


 


 


copyright 2011 [employer]- All Rights Reserved


 














Assessment and Plan





- Diagnosis


(1) Angina at rest


Is this a current diagnosis for this admission?: Yes   





(2) Coronary artery disease


Qualifiers: 


   Coronary Disease-Associated Artery/Lesion type: native artery   Native vs. 

transplanted heart: native heart   Associated angina: with unstable angina   

Qualified Code(s): I25.110 - Atherosclerotic heart disease of native coronary 

artery with unstable angina pectoris   


Is this a current diagnosis for this admission?: Yes   





(3) Diabetes mellitus type 2 in obese


Is this a current diagnosis for this admission?: Yes   





(4) Morbid obesity


Is this a current diagnosis for this admission?: Yes   





- Plan Summary


Summary: 


Patient is admitted observation status will have serial cardiac enzymes 

performed.  A cardiac stress test will be obtained using a Cardiolite protocol. 

 The patient will be observed on telemetry bed and will be reevaluated based 

upon the results of his cardiac stress test and cardiac enzyme testing.  He will

 use morphine sulfate 2 to 4 mg IV every 2 hours on a as needed basis via 

sliding scale for pain.  He will be continued on a cardiac diet with a diabetic 

carb for plan.  He will be continued on his usual medications for heart disease 

hypertension and diabetes as well as his other usual medications.  A hemoglobin 

A1c will be obtained.  Patient will be placed on a sliding scale for 

hyperglycemia and a hypoglycemic protocol will be in place for use with before 

meals and at bedtime Accu-Cheks.





- Time


Time Spent with patient: 25-34 minutes


Medications reviewed and adjusted accordingly: Yes


Anticipated discharge: Home

## 2019-10-04 NOTE — RADIOLOGY REPORT (SQ)
EXAM DESCRIPTION: 



XR CHEST 2 VIEWS



COMPLETED DATE/TME:  10/04/2019 01:22



CLINICAL HISTORY: 



69 years, Male, shortness of breath



COMPARISON:

None.



NUMBER OF VIEWS:

2



TECHNIQUE:

2 view chest



LIMITATIONS:

None.



FINDINGS:



Heart size normal. Post surgical change of the mediastinum. Mild

elevation left hemidiaphragm. Lungs clear. No pneumothorax.

Central venous catheter, with the tip in the cavoatrial junction



IMPRESSION:



No acute cardiopulmonary process

 



copyright 2011 [employer]- All Rights Reserved

## 2019-10-04 NOTE — EKG REPORT
SEVERITY:- BORDERLINE ECG -

SINUS RHYTHM

PROBABLE LEFT ATRIAL ABNORMALITY

BORDERLINE T ABNORMALITIES, INFERIOR LEADS

:

Confirmed by: Alannah Lindo MD 04-Oct-2019 13:38:26

## 2019-10-04 NOTE — ER DOCUMENT REPORT
ED General





- General


Chief Complaint: Shortness Of Breath


Stated Complaint: SHORTNESS OF BREATH,HEAVINESS IN CHEST


Time Seen by Provider: 10/04/19 01:16


Primary Care Provider: 


JOVI BERG MD [Primary Care Provider] - Follow up as needed


TRAVEL OUTSIDE OF THE [place] IN LAST 30 DAYS: No





- HPI


Notes: 





This is a 69-year-old gentleman who presented with a complaint of chest 

discomfort or shortness of breath.  Patient states that he attempted episodes of

chest pain for the past 1 to 2 days.  He was seen by his primary care provider 

yesterday they are planning to schedule a stress test for him given his cardiac 

history.  Tonight, he said having some trouble breathing.  He did have brief 

discomfort in his chest but did not denies any chest pain now.  Patient is a 

poor historian.  He has a history of heart disease status post bypass but states

he does not know what his anginal equivalent is.  He denies any fever or chills.

 He denies any cough or congestion.  He is currently asymptomatic.  





- Related Data


Allergies/Adverse Reactions: 


                                        





No Known Allergies Allergy (Verified 12/13/17 16:21)


   











Past Medical History





- Social History


Smoking Status: Former Smoker


Frequency of alcohol use: None


Drug Abuse: None


Family History: Reviewed & Not Pertinent


Patient has suicidal ideation: No


Patient has homicidal ideation: No





- Past Medical History


Cardiac Medical History: Reports: Hx Coronary Artery Disease, Hx 

Hypercholesterolemia, Hx Hypertension, Hx Peripheral Vascular Disease


Neurological Medical History: Reports: Hx Cerebrovascular Accident - 

Approximately 1 year ago.  Denies: Hx Seizures


Endocrine Medical History: Reports: Hx Diabetes Mellitus Type 1, Hx Diabetes 

Mellitus Type 2


Renal/ Medical History: Denies: Hx End Stage Renal Disease, Hx Peritoneal 

Dialysis


GI Medical History: Denies: Hx Cirrhosis


Musculoskeletal Medical History: Denies Hx Fibromyalgia, Denies Hx Gout


Skin Medical History: Denies Hx Eczema, Denies Hx Psoriasis


Psychiatric Medical History: Reports: Hx Depression


   Denies: Hx Dementia


Past Surgical History: Reports: Hx Cardiac Catheterization, Hx Coronary Stent, 

Hx Neurologic Surgery - shunts of brain for hydrocephalus., Hx Orthopedic 

Surgery - bilateral rotator cuff repairs





Review of Systems





- Review of Systems


Constitutional: denies: Fever


Cardiovascular: Chest pain.  denies: Palpitations, Heart racing, Syncope


Respiratory: Short of breath.  denies: Cough


-: Yes All other systems reviewed and negative





Physical Exam





- Vital signs


Vitals: 


                                        











Temp Pulse Resp BP Pulse Ox


 


 97.3 F   92   20   170/84 H  100 


 


 10/04/19 00:42  10/04/19 00:42  10/04/19 00:42  10/04/19 00:42  10/04/19 00:42














- General


General appearance: Appears well, Alert





- HEENT


Head: Normocephalic, Atraumatic


Eyes: Normal


Pupils: PERRL





- Respiratory


Respiratory status: No respiratory distress


Chest status: Nontender


Breath sounds: Normal


Chest palpation: Normal





- Cardiovascular


Rhythm: Regular


Heart sounds: Normal auscultation


Murmur: No





- Abdominal


Inspection: Normal


Distension: No distension


Bowel sounds: Normal


Tenderness: Nontender


Organomegaly: No organomegaly





- Extremities


General upper extremity: Normal inspection, Nontender, Normal color, Normal ROM,

Normal temperature


General lower extremity: Normal inspection, Nontender, Normal color, Normal ROM,

Normal temperature.  No: Marcelino's sign





- Neurological


Neuro grossly intact: Yes


Cognition: Normal


Orientation: AAOx4


Kim Coma Scale Eye Opening: Spontaneous


[place]inton Coma Scale Verbal: Oriented


Kim Coma Scale Motor: Obeys Commands


[place]inton Coma Scale Total: 15


Speech: Normal


Motor strength normal: LUE, RUE, LLE, RLE


Sensory: Normal





- Psychological


Associated symptoms: Normal affect, Normal mood





- Skin


Skin Temperature: Warm


Skin Moisture: Dry


Skin Color: Normal





Course





- Re-evaluation


Re-evalutation: 





10/04/19 04:11


Differential diagnosis includes acute coronary syndrome versus atypical chest 

pain versus anxiety versus pneumonia.





EKG shows normal sinus rhythm at 90 bpm.  No specific T wave abnormalities.  No 

acute injury pattern.


10/04/19 04:13


Patient reevaluated.  Patient is doing well.  Given his cardiac risk factors 

combined heart score of 6, he will need to be admitted for ACS rule out.


10/04/19 05:29


Patient's care discussed with Dr. Weiland.  Will admit for ACS rule out.





- Vital Signs


Vital signs: 


                                        











Temp Pulse Resp BP Pulse Ox


 


 97.3 F   92   20   170/84 H  100 


 


 10/04/19 00:42  10/04/19 00:42  10/04/19 00:42  10/04/19 00:42  10/04/19 00:42














- Laboratory


Result Diagrams: 


                                 10/04/19 01:58





                                 10/04/19 01:58


Laboratory results interpreted by me: 


                                        











  10/04/19 10/04/19





  01:58 01:58


 


RBC  5.93 H 


 


Hgb  17.6 H 


 


Hct  52.7 H 


 


BUN   21 H


 


Glucose   184 H














Discharge





- Discharge


Clinical Impression: 


 Angina at rest





Dyspnea


Qualifiers:


 Dyspnea type: shortness of breath Qualified Code(s): R06.02 - Shortness of 

breath; R06.00 - Dyspnea, unspecified; R06.01 - Orthopnea





Condition: Stable


Disposition: ADMITTED AS INPATIENT


Admitting Provider: Weiland (Hospitalist)


Unit Admitted: Telemetry


Referrals: 


JOVI BERG MD [Primary Care Provider] - Follow up as needed

## 2019-10-05 VITALS — DIASTOLIC BLOOD PRESSURE: 74 MMHG | SYSTOLIC BLOOD PRESSURE: 103 MMHG

## 2019-10-05 RX ADMIN — DOCUSATE SODIUM SCH MG: 100 CAPSULE, LIQUID FILLED ORAL at 09:54

## 2019-10-05 RX ADMIN — INSULIN HUMAN SCH: 100 INJECTION, SOLUTION PARENTERAL at 08:56

## 2019-10-05 RX ADMIN — GABAPENTIN SCH MG: 100 CAPSULE ORAL at 09:54

## 2019-10-05 RX ADMIN — FAMOTIDINE SCH MG: 20 TABLET, FILM COATED ORAL at 09:53

## 2019-10-05 RX ADMIN — METOPROLOL TARTRATE SCH MG: 50 TABLET, FILM COATED ORAL at 09:54

## 2019-10-05 RX ADMIN — HEPARIN SODIUM SCH UNIT: 5000 INJECTION, SOLUTION INTRAVENOUS; SUBCUTANEOUS at 05:37

## 2019-10-05 RX ADMIN — Medication SCH ML: at 05:37

## 2019-10-05 NOTE — PDOC DISCHARGE SUMMARY
Impression





- Admit/DC Date/PCP


Admission Date/Primary Care Provider: 


  10/04/19 05:40





  JOVI BERG MD





Discharge Date: 10/05/19





- Discharge Diagnosis


(1) Angina at rest


Is this a current diagnosis for this admission?: Yes   





(2) Diabetes mellitus type 2 in obese


Is this a current diagnosis for this admission?: Yes   





(3) Coronary artery disease


Is this a current diagnosis for this admission?: Yes   





(4) Depression with anxiety


Is this a current diagnosis for this admission?: Yes   





- Assessment


Summary: 


Patient is admitted observation status will have serial cardiac enzymes 

performed.  A cardiac stress test will be obtained using a Cardiolite protocol. 

The patient will be observed on telemetry bed and will be reevaluated based upon

the results of his cardiac stress test and cardiac enzyme testing.  He will use 

morphine sulfate 2 to 4 mg IV every 2 hours on a as needed basis via sliding 

scale for pain.  He will be continued on a cardiac diet with a diabetic carb for

plan.  He will be continued on his usual medications for heart disease 

hypertension and diabetes as well as his other usual medications.  A hemoglobin 

A1c will be obtained.  Patient will be placed on a sliding scale for 

hyperglycemia and a hypoglycemic protocol will be in place for use with before 

meals and at bedtime Accu-Cheks.





10/4/2019-


I reviewed Dr. Weiland's admission history and physical.  I reviewed the 

patient's troponins and these are negative so far.  Despite his history of 

coronary disease I had a long discussion with the patient and his wife.  He has 

been under a lot of stress.  He also has a history of reflux.  He has not been 

sleeping well.  His wife also reports that he suffers from significant anxiety. 

The cardiac surgery and subsequent stroke have had a devastating effect on the 

patient.  We reviewed all the reasons why his current condition would support a 

cardiac diagnosis versus a depression/anxiety diagnosis.  We revealed all of the

contributing factors and the patient's overall clinical status lately.  His wife

states that he is very inactive.  He reports that he is very disillusioned with 

his current condition.  His wife reports that is very short tempered.  I 

reviewed how all of this was more likely anxiety and depression and this 

certainly was contributing to his chest discomfort.  His wife reports that at 

home when he had been having similar symptoms she provided a dose of 

benzodiazepine and the symptoms went away and the patient was able to relax and 

go to sleep.  Because of his cardiac history I will resume isosorbide (this is 

actually a new prescription for the patient just started 2 days ago) as well as 

his other cardiac medications.  We are going to be in arranging for stress test 

as an outpatient.  I told the patient that I strongly believe his cardiac 

enzymes will be negative.  The patient's wife informed me that Dr. Ruben Treviño 

has already started to arrange outpatient stress test.  The patient has a 

follow-up with Dr. Ruben Treviño as well as Dr. Rhoades post discharge.  He is 

also following with Dr. Lee his psychiatrist and I strongly encouraged the 

patient and his wife to discuss anxiety and depression.


Interestingly enough, the patient is listed as being on Lexapro.  The patient 

and his wife or not aware that he had been prescribed an antidepressant.  His 

pills are bubble packed so it is difficult to keep tractable the different 

pills.


Towards the end of our discussion the patient in fact said that he was having 

substernal chest discomfort.  Nitroglycerin is available.  He did not take his 

him door this morning and so I ordered a dose for now.  I also ordered a single 

dose of benzodiazepine therapy as I have a strong feeling that this will relieve

his discomfort.





- Additional Information


Resuscitation Status: Full Code


Discharge Diet: Cardiac, Diabetic


Discharge Activity: Activity As Tolerated


Referrals: 


WILMAN RHOADES MD [[facility] TENENS] - 10/16/19 2:00 pm


JOVI BERG MD [Primary Care Provider] - 10/25/19 11:00 am (NO OTHER 

APPOINTMENTS AVAILABLE)


Home Medications: 








Dapagliflozin Propanediol [Farxiga] 5 mg PO DAILY 06/17/18 


Escitalopram Oxalate [Lexapro] 20 mg PO DAILY 06/17/18 


Gabapentin [Neurontin 100 mg Capsule] 200 mg PO DAILY@1000,1400,1800 06/17/18 


Omeprazole 40 mg PO QPM 06/17/18 


Tamsulosin HCl [Flomax 0.4 mg Cap.sr] 0.4 mg PO DAILY 06/17/18 


Atorvastatin Calcium [Lipitor 20 mg Tablet] 40 mg PO QHS  tablet 06/22/18 


Clopidogrel Bisulfate [Plavix 75 mg Tablet] 75 mg PO DAILY  tablet 06/22/18 


Aspirin [Ecotrin 81 mg EC Tablet] 81 mg PO DAILY 10/04/19 


Fesoterodine Fumarate [Toviaz] 8 mg PO DAILY 10/04/19 


Insulin Aspart [Novolog Flexpen] 10 unit SQ TID 10/04/19 


Insulin Degludec [Tresiba Flextouch U-100] 50 unit SQ DAILY 10/04/19 


Isosorbide Mononitrate [Imdur 30 mg Tablet.er] 30 mg PO DAILY 10/04/19 


Lisinopril [Zestril] 2.5 mg PO QPM 10/04/19 


Metoprolol Tartrate [Lopressor 50 mg Tablet] 50 mg PO Q12 10/04/19 


Docusate Sodium [Colace 100 mg Capsule] 100 mg PO BID  capsule 10/05/19 











History of Present Illiness


History of Present Illness: 


OJVI VITALE is a 69 year old male with a history of stroke after coronary 

artery bypass graft surgery.  He also has a history of diabetes.  He presented 

with chest discomfort.  He has been having discomfort in the past.  His wife 

reports that sometimes it does not respond to nitroglycerin and so she has not 

been giving it.  He does respond to benzodiazepine therapy and so she thinks it 

might be anxiety.  Because of his cardiac history the patient was referred to 

the hospital service for admission.  First troponin was negative.








Hospital Course


Hospital Course: 


Unremarkable hospital course.  For troponin studies were less than 0.012.  The 

patient was having some chest discomfort but this did respond to benzodiazepine 

therapy.  In a long discussion with patient and his wife.  It is likely that he 

is depressed from the stroke after his open heart surgery.  His wife reports 

that he is emotionally labile at home.  He is on Lexapro 20 mg.  They were not 

sure what medications they are on because the medications are blister packed at 

home.  They do not have the blister pack and so we are unable to check labels.  

I have ordered his medications based on pharmacy's medication reconciliation.  I

explained to the patient that I do not believe this is cardiac.  I still want 

him to have a stress test because his primary care physician and cardiologist 

were working on scheduling an outpatient stress test.  He will call his 

cardiologist and primary care provider Monday to see where we are with the 

outpatient stress test.  I did asked that he increase his M door to the full 30 

mg tablet as he was only taking 15 mg previously.  The patient had good 

understanding and agreed with discharge.





Physical Exam


Vital Signs: 


                                        











Temp Pulse Resp BP Pulse Ox


 


 97.6 F   69   16   130/74 H  90 L


 


 10/05/19 07:31  10/05/19 07:31  10/05/19 07:31  10/05/19 07:31  10/05/19 07:31








                                 Intake & Output











 10/04/19 10/05/19 10/06/19





 06:59 06:59 06:59


 


Intake Total  1968 


 


Balance  1968 


 


Weight 95.5 kg 90.5 kg 











General appearance: PRESENT: no acute distress, well-developed


Head exam: PRESENT: atraumatic, normocephalic


Respiratory exam: PRESENT: clear to auscultation orlin, symmetrical, unlabored.  

ABSENT: rales, rhonchi, tachypnea, wheezes


Cardiovascular exam: PRESENT: RRR, +S1, +S2, systolic murmur


GI/Abdominal exam: PRESENT: normal bowel sounds, soft.  ABSENT: distended, 

tenderness


Neurological exam: PRESENT: alert, awake, oriented to person, oriented to place,

oriented to situation, other - Noticeable cognitive deficit likely secondary to 

history of stroke earlier this year


Psychiatric exam: ABSENT: agitated, anxious





Results


Laboratory Results: 


                                        











WBC  9.0 10^3/uL (4.0-10.5)   10/04/19  01:58    


 


RBC  5.93 10^6/uL (4.35-5.55)  H  10/04/19  01:58    


 


Hgb  17.6 g/dL (13.5-17.0)  H  10/04/19  01:58    


 


Hct  52.7 % (37.9-51.0)  H  10/04/19  01:58    


 


MCV  89 fl (80-97)   10/04/19  01:58    


 


MCH  29.7 pg (27.0-33.4)   10/04/19  01:58    


 


MCHC  33.3 g/dL (32.0-36.0)   10/04/19  01:58    


 


RDW  13.9 % (11.5-14.0)   10/04/19  01:58    


 


Plt Count  216 10^3/uL (150-450)   10/04/19  01:58    


 


Lymph % (Auto)  19.0 % (13-45)   10/04/19  01:58    


 


Mono % (Auto)  8.2 % (3-13)   10/04/19  01:58    


 


Eos % (Auto)  3.0 % (0-6)   10/04/19  01:58    


 


Baso % (Auto)  0.7 % (0-2)   10/04/19  01:58    


 


Absolute Neuts (auto)  Not Reportable   10/04/19  01:58    


 


Absolute Lymphs (auto)  Not Reportable   10/04/19  01:58    


 


Absolute Monos (auto)  Not Reportable   10/04/19  01:58    


 


Absolute Eos (auto)  Not Reportable   10/04/19  01:58    


 


Absolute Basos (auto)  Not Reportable   10/04/19  01:58    


 


Total Counted  Not Reportable   10/04/19  01:58    


 


Seg Neutrophils %  69.1 % (42-78)   10/04/19  01:58    


 


Seg Neuts % (Manual)  Not Reportable   10/04/19  01:58    


 


Lymphocytes % (Manual)  Not Reportable   10/04/19  01:58    


 


Monocytes % (Manual)  Not Reportable   10/04/19  01:58    


 


Eosinophils % (Manual)  Not Reportable   10/04/19  01:58    


 


Basophils % (Manual)  Not Reportable   10/04/19  01:58    


 


Abs Neuts (Manual)  Not Reportable   10/04/19  01:58    


 


Abs Lymphs (Manual)  Not Reportable   10/04/19  01:58    


 


Abs Monocytes (Manual)  Not Reportable   10/04/19  01:58    


 


Absolute Eos (Manual)  Not Reportable   10/04/19  01:58    


 


Abs Basophils (Manual)  Not Reportable   10/04/19  01:58    


 


Platelet Comment  ADEQUATE   10/04/19  01:58    


 


VBG pH  7.36  (7.30-7.42)   10/04/19  01:58    


 


VBG pCO2  36.1 mmHg (35-63)   10/04/19  01:58    


 


VBG HCO3  20.1 mmol/L (20-32)   10/04/19  01:58    


 


VBG Base Excess  -4.4 mmol/L  10/04/19  01:58    


 


Sodium  138.3 mmol/L (137-145)   10/04/19  01:58    


 


Potassium  4.3 mmol/L (3.6-5.0)   10/04/19  01:58    


 


Chloride  101 mmol/L ()   10/04/19  01:58    


 


Carbon Dioxide  25 mmol/L (22-30)   10/04/19  01:58    


 


Anion Gap  12  (5-19)   10/04/19  01:58    


 


BUN  21 mg/dL (7-20)  H  10/04/19  01:58    


 


Creatinine  0.95 mg/dL (0.52-1.25)   10/04/19  01:58    


 


Est GFR ( Amer)  > 60  (>60)   10/04/19  01:58    


 


Est GFR (MDRD) Non-Af  > 60  (>60)   10/04/19  01:58    


 


Glucose  184 mg/dL ()  H  10/04/19  01:58    


 


POC Glucose  138 mg/dL ()  H  10/05/19  06:23    


 


Calcium  9.9 mg/dL (8.4-10.2)   10/04/19  01:58    


 


Total Bilirubin  0.6 mg/dL (0.2-1.3)   10/04/19  01:58    


 


Direct Bilirubin  0.2 mg/dL (0.0-0.4)   10/04/19  01:58    


 


Neonat Total Bilirubin  Not Reportable   10/04/19  01:58    


 


Neonat Direct Bilirubin  Not Reportable   10/04/19  01:58    


 


Neonat Indirect Bili  Not Reportable   10/04/19  01:58    


 


AST  32 U/L (17-59)   10/04/19  01:58    


 


ALT  43 U/L (<50)   10/04/19  01:58    


 


Alkaline Phosphatase  75 U/L ()   10/04/19  01:58    


 


Creatine Kinase  74 U/L ()   10/04/19  19:55    


 


CK-MB (CK-2)  3.14 ng/mL (<4.55)   10/04/19  19:55    


 


Troponin I  < 0.012 ng/mL  10/04/19  19:55    


 


Total Protein  7.1 g/dL (6.3-8.2)   10/04/19  01:58    


 


Albumin  4.3 g/dL (3.5-5.0)   10/04/19  01:58    








                                        











  10/04/19 10/04/19 10/04/19





  01:58 08:27 14:33


 


CK-MB (CK-2)   3.82  3.68


 


Troponin I  < 0.012  < 0.012  < 0.012














  10/04/19





  19:55


 


CK-MB (CK-2)  3.14


 


Troponin I  < 0.012











Impressions: 


                                        





Chest X-Ray  10/04/19 01:22


IMPRESSION:


 


No acute cardiopulmonary process


 


 


copyright 2011 [employer]- All Rights Reserved


 














Plan


Health Concerns: 


This episode is likely more depression/anxiety related.  The patient will follow

up with cardiology and primary care but also has appointment with his 

psychiatrist.  I stressed to the patient and his wife to present in some detail 

the patient's emotional state.  Outpatient stress test is being scheduled.


Plan of Treatment: 


Outpatient stress test.  Increase isosorbide slightly.  Follow-up with primary 

care, cardiology and psychiatry


Goals: 


Proceed with stress test to reevaluate cardiac status.  Improve treatment of 

depression/anxiety


Time Spent: Greater than 30 Minutes





Stroke


Is this a Stroke Patient?: No





Acute Heart Failure





- **


Is this a Heart Failure Patient?: No

## 2020-03-20 ENCOUNTER — HOSPITAL ENCOUNTER (EMERGENCY)
Dept: HOSPITAL 62 - ER | Age: 70
LOS: 1 days | Discharge: HOME | End: 2020-03-21
Payer: MEDICARE

## 2020-03-20 DIAGNOSIS — Z86.73: ICD-10-CM
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
## 2020-03-20 DIAGNOSIS — N39.0: ICD-10-CM

## 2020-03-20 DIAGNOSIS — R31.9: ICD-10-CM

## 2020-03-20 DIAGNOSIS — E78.00: ICD-10-CM

## 2020-03-20 DIAGNOSIS — E11.9: ICD-10-CM

## 2020-03-20 DIAGNOSIS — I25.10: ICD-10-CM

## 2020-03-20 DIAGNOSIS — B96.89: ICD-10-CM

## 2020-03-20 DIAGNOSIS — T83.511A: Primary | ICD-10-CM

## 2020-03-20 DIAGNOSIS — I10: ICD-10-CM

## 2020-03-20 LAB
ADD MANUAL DIFF: NO
ALBUMIN SERPL-MCNC: 3.9 G/DL (ref 3.5–5)
ALP SERPL-CCNC: 73 U/L (ref 38–126)
ANION GAP SERPL CALC-SCNC: 13 MMOL/L (ref 5–19)
APPEARANCE UR: (no result)
APPEARANCE UR: (no result)
APTT PPP: (no result) S
APTT PPP: (no result) S
AST SERPL-CCNC: 32 U/L (ref 17–59)
BASOPHILS # BLD AUTO: 0.1 10^3/UL (ref 0–0.2)
BASOPHILS NFR BLD AUTO: 0.5 % (ref 0–2)
BILIRUB DIRECT SERPL-MCNC: 0.3 MG/DL (ref 0–0.4)
BILIRUB SERPL-MCNC: 0.6 MG/DL (ref 0.2–1.3)
BILIRUB UR QL STRIP: NEGATIVE
BILIRUB UR QL STRIP: NEGATIVE
BUN SERPL-MCNC: 12 MG/DL (ref 7–20)
CALCIUM: 9.3 MG/DL (ref 8.4–10.2)
CHLORIDE SERPL-SCNC: 98 MMOL/L (ref 98–107)
CO2 SERPL-SCNC: 25 MMOL/L (ref 22–30)
EOSINOPHIL # BLD AUTO: 0.3 10^3/UL (ref 0–0.6)
EOSINOPHIL NFR BLD AUTO: 2.8 % (ref 0–6)
ERYTHROCYTE [DISTWIDTH] IN BLOOD BY AUTOMATED COUNT: 13.4 % (ref 11.5–14)
GLUCOSE SERPL-MCNC: 137 MG/DL (ref 75–110)
GLUCOSE UR STRIP-MCNC: NEGATIVE MG/DL
GLUCOSE UR STRIP-MCNC: NEGATIVE MG/DL
HCT VFR BLD CALC: 44.2 % (ref 37.9–51)
HGB BLD-MCNC: 15.5 G/DL (ref 13.5–17)
KETONES UR STRIP-MCNC: NEGATIVE MG/DL
KETONES UR STRIP-MCNC: NEGATIVE MG/DL
LYMPHOCYTES # BLD AUTO: 1.2 10^3/UL (ref 0.5–4.7)
LYMPHOCYTES NFR BLD AUTO: 11.7 % (ref 13–45)
MCH RBC QN AUTO: 31.5 PG (ref 27–33.4)
MCHC RBC AUTO-ENTMCNC: 35.1 G/DL (ref 32–36)
MCV RBC AUTO: 90 FL (ref 80–97)
MONOCYTES # BLD AUTO: 0.7 10^3/UL (ref 0.1–1.4)
MONOCYTES NFR BLD AUTO: 6.8 % (ref 3–13)
NEUTROPHILS # BLD AUTO: 7.9 10^3/UL (ref 1.7–8.2)
NEUTS SEG NFR BLD AUTO: 78.2 % (ref 42–78)
NITRITE UR QL STRIP: POSITIVE
PH UR STRIP: 5 [PH] (ref 5–9)
PH UR STRIP: 6 [PH] (ref 5–9)
PLATELET # BLD: 255 10^3/UL (ref 150–450)
POTASSIUM SERPL-SCNC: 4.2 MMOL/L (ref 3.6–5)
PROT SERPL-MCNC: 7.2 G/DL (ref 6.3–8.2)
PROT UR STRIP-MCNC: 100 MG/DL
PROT UR STRIP-MCNC: 100 MG/DL
RBC # BLD AUTO: 4.92 10^6/UL (ref 4.35–5.55)
SP GR UR STRIP: 1.02
SP GR UR STRIP: 1.02
TOTAL CELLS COUNTED % (AUTO): 100 %
UROBILINOGEN UR-MCNC: NEGATIVE MG/DL (ref ?–2)
UROBILINOGEN UR-MCNC: NEGATIVE MG/DL (ref ?–2)
WBC # BLD AUTO: 10.1 10^3/UL (ref 4–10.5)

## 2020-03-20 PROCEDURE — 81001 URINALYSIS AUTO W/SCOPE: CPT

## 2020-03-20 PROCEDURE — 99284 EMERGENCY DEPT VISIT MOD MDM: CPT

## 2020-03-20 PROCEDURE — 80053 COMPREHEN METABOLIC PANEL: CPT

## 2020-03-20 PROCEDURE — 87086 URINE CULTURE/COLONY COUNT: CPT

## 2020-03-20 PROCEDURE — 87186 SC STD MICRODIL/AGAR DIL: CPT

## 2020-03-20 PROCEDURE — 87088 URINE BACTERIA CULTURE: CPT

## 2020-03-20 PROCEDURE — 85025 COMPLETE CBC W/AUTO DIFF WBC: CPT

## 2020-03-20 PROCEDURE — 36415 COLL VENOUS BLD VENIPUNCTURE: CPT

## 2020-03-20 NOTE — ER DOCUMENT REPORT
ED General





- General


Chief Complaint: Blood in Catheter


Stated Complaint: BLOOD IN URINE


Time Seen by Provider: 03/20/20 21:34


Primary Care Provider: 


JOVI BERG MD [Primary Care Provider] - Follow up as needed


Notes: 





69-year-old male history of CVA x2 in the past presents with complaint of 

irritation from the indwelling Doyle catheter.  Family member notes that the 

catheter is been in for greater than a month and has not been changed since he 

was discharged from the long-term care facility in [place].  Patient is 

nonverbal but appears to understand questions and nods his head for yes and no 

answers.  He admits to discomfort and burning, denies abdominal pain or back 

pain.


TRAVEL OUTSIDE OF THE [place] IN LAST 30 DAYS: No





- Related Data


Allergies/Adverse Reactions: 


                                        





No Known Allergies Allergy (Verified 12/13/17 16:21)


   








Home Medications: Meds in room.





Past Medical History





- Social History


Smoking Status: Unknown if Ever Smoked


Family History: CAD, Malignancy


Patient has suicidal ideation: No


Patient has homicidal ideation: No





- Past Medical History


Cardiac Medical History: Reports: Hx Coronary Artery Disease, Hx Hyper

cholesterolemia, Hx Hypertension, Hx Peripheral Vascular Disease


Pulmonary Medical History: 


   Denies: Hx Asthma, Hx COPD


Neurological Medical History: Reports: Hx Cerebrovascular Accident - 

Approximately 1 year ago.  Denies: Hx Seizures


Endocrine Medical History: Reports: Hx Diabetes Mellitus Type 2.  Denies: Hx 

Diabetes Mellitus Type 1, Hx Hyperthyroidism, Hx Hypothyroidism


Renal/ Medical History: Denies: Hx End Stage Renal Disease, Hx Peritoneal 

Dialysis


GI Medical History: Denies: Hx Cirrhosis, Hx Hepatitis


Musculoskeletal Medical History: Denies Hx Arthritis, Denies Hx Fibromyalgia, 

Denies Hx Gout


Skin Medical History: Denies Hx Eczema, Denies Hx Psoriasis


Psychiatric Medical History: Reports: Hx Depression


   Denies: Hx Dementia


Infectious Medical History: Denies: Hx Hepatitis


Past Surgical History: Reports: Hx Cardiac Catheterization, Hx Coronary Stent, 

Hx Neurologic Surgery - shunts of brain for hydrocephalus., Hx Orthopedic Surg

sergio - bilateral rotator cuff repairs





Review of Systems





- Review of Systems


Notes: 





Constitutional: Negative for fever.


HENT: Negative for sore throat.


Eyes: Negative for visual changes.


Cardiovascular: Negative for chest pain.


Respiratory: Negative for shortness of breath.


Gastrointestinal: Negative for abdominal pain, vomiting or diarrhea.


Genitourinary: Negative for dysuria.


Musculoskeletal: Negative for back pain.


Skin: Negative for rash.


Neurological: Negative for headaches, weakness or numbness.





10 point ROS negative except as marked above and in HPI.





Physical Exam





- Vital signs


Vitals: 


                                        











Temp Pulse Resp BP Pulse Ox


 


 97.4 F   76   20   131/72 H  96 


 


 03/20/20 19:45  03/20/20 19:45  03/20/20 19:45  03/20/20 19:45  03/20/20 19:45














- Notes


Notes: 





PHYSICAL EXAMINATION:


 


Physical Exam:


General: Chronically ill 69-year-old man in no acute distress


HEENT: NC/AT, pupils equal round and reactive to light, MM moist,nares clear, 

oropharynx clear, airway patent


Neck: supple, no adenopathy, no masses.  Good range of motion


Lungs: clear, no wheezing, no rales no rhonchi


CVS: Regular rate and rhythm no murmur gallop or rub


Abdomen: Soft, active, nontender, no masses, no hepatosplenomegaly


: Indwelling Doyle catheter


Ext:   No edema, clubbing or cyanosis.


Neuro: Alert and responsive, moving all 4 extremities on command, cranial nerves

intact, no focal findings


Skin: Intact no open lesions, no rash


PSYCH: Normal mood, normal affect.


 








Course





- Vital Signs


Vital signs: 


                                        











Temp Pulse Resp BP Pulse Ox


 


 97.4 F   76   20   131/72 H  96 


 


 03/20/20 19:45  03/20/20 19:45  03/20/20 19:45  03/20/20 19:45  03/20/20 19:45














- Laboratory


Result Diagrams: 


                                 03/20/20 19:43





                                 03/20/20 19:43


Laboratory results interpreted by me: 


                                        











  03/20/20 03/20/20 03/20/20





  19:43 19:43 19:43


 


Lymph % (Auto)  11.7 L  


 


Seg Neutrophils %  78.2 H  


 


Sodium   135.6 L 


 


Glucose   137 H 


 


Urine Protein    100 H


 


Urine Blood    MODERATE H


 


Urine Nitrite    POSITIVE H


 


Ur Leukocyte Esterase    MODERATE H


 


Urine Ascorbic Acid    20 H














Discharge





- Discharge


Clinical Impression: 


 Chronic indwelling Doyle catheter





UTI (urinary tract infection)


Qualifiers:


 Urinary tract infection type: site unspecified Hematuria presence: with h

ematuria Qualified Code(s): N39.0 - Urinary tract infection, site not specified;

R31.9 - Hematuria, unspecified





Condition: Good


Disposition: HOME, SELF-CARE


Instructions:  Urinary Tract Infection ([facility]), Doyle Catheter Care ([facility])


Additional Instructions: 


You were diagnosed with a catheter related urinary tract infection.  Please take

the antibiotics as prescribed, cephalexin.  Follow-up with your doctor as 

directed for a repeat urinalysis in 10 days.  If you develop fever or other 

concerns you may return to the emergency department for further evaluation and 

treatment. 


Prescriptions: 


Cephalexin Monohydrate [Keflex 500 mg Capsule] 500 mg PO Q8 10 Days  capsule


Referrals: 


JOVI BERG MD [Primary Care Provider] - Follow up as needed

## 2020-03-21 VITALS — SYSTOLIC BLOOD PRESSURE: 166 MMHG | DIASTOLIC BLOOD PRESSURE: 85 MMHG
